# Patient Record
Sex: FEMALE | Race: WHITE | NOT HISPANIC OR LATINO | Employment: FULL TIME | URBAN - METROPOLITAN AREA
[De-identification: names, ages, dates, MRNs, and addresses within clinical notes are randomized per-mention and may not be internally consistent; named-entity substitution may affect disease eponyms.]

---

## 2023-03-02 ENCOUNTER — HOSPITAL ENCOUNTER (EMERGENCY)
Facility: MEDICAL CENTER | Age: 58
End: 2023-03-02
Attending: EMERGENCY MEDICINE
Payer: MEDICARE

## 2023-03-02 ENCOUNTER — TELEPHONE (OUTPATIENT)
Dept: HEALTH INFORMATION MANAGEMENT | Facility: OTHER | Age: 58
End: 2023-03-02
Payer: MEDICARE

## 2023-03-02 VITALS
HEIGHT: 69 IN | SYSTOLIC BLOOD PRESSURE: 144 MMHG | BODY MASS INDEX: 32.07 KG/M2 | TEMPERATURE: 98.1 F | DIASTOLIC BLOOD PRESSURE: 90 MMHG | OXYGEN SATURATION: 94 % | RESPIRATION RATE: 15 BRPM | HEART RATE: 80 BPM | WEIGHT: 216.49 LBS

## 2023-03-02 DIAGNOSIS — N30.00 ACUTE CYSTITIS WITHOUT HEMATURIA: ICD-10-CM

## 2023-03-02 LAB
ALBUMIN SERPL BCP-MCNC: 4.4 G/DL (ref 3.2–4.9)
ALBUMIN/GLOB SERPL: 1.7 G/DL
ALP SERPL-CCNC: 115 U/L (ref 30–99)
ALT SERPL-CCNC: 13 U/L (ref 2–50)
ANION GAP SERPL CALC-SCNC: 9 MMOL/L (ref 7–16)
APPEARANCE UR: CLEAR
AST SERPL-CCNC: 15 U/L (ref 12–45)
BACTERIA #/AREA URNS HPF: ABNORMAL /HPF
BASOPHILS # BLD AUTO: 0.7 % (ref 0–1.8)
BASOPHILS # BLD: 0.04 K/UL (ref 0–0.12)
BILIRUB SERPL-MCNC: 0.5 MG/DL (ref 0.1–1.5)
BILIRUB UR QL STRIP.AUTO: NEGATIVE
BUN SERPL-MCNC: 20 MG/DL (ref 8–22)
CALCIUM ALBUM COR SERPL-MCNC: 9 MG/DL (ref 8.5–10.5)
CALCIUM SERPL-MCNC: 9.3 MG/DL (ref 8.5–10.5)
CHLORIDE SERPL-SCNC: 107 MMOL/L (ref 96–112)
CO2 SERPL-SCNC: 22 MMOL/L (ref 20–33)
COLOR UR: YELLOW
CREAT SERPL-MCNC: 0.61 MG/DL (ref 0.5–1.4)
EOSINOPHIL # BLD AUTO: 0.1 K/UL (ref 0–0.51)
EOSINOPHIL NFR BLD: 1.7 % (ref 0–6.9)
EPI CELLS #/AREA URNS HPF: NEGATIVE /HPF
ERYTHROCYTE [DISTWIDTH] IN BLOOD BY AUTOMATED COUNT: 39.1 FL (ref 35.9–50)
GFR SERPLBLD CREATININE-BSD FMLA CKD-EPI: 103 ML/MIN/1.73 M 2
GLOBULIN SER CALC-MCNC: 2.6 G/DL (ref 1.9–3.5)
GLUCOSE SERPL-MCNC: 98 MG/DL (ref 65–99)
GLUCOSE UR STRIP.AUTO-MCNC: NEGATIVE MG/DL
HCT VFR BLD AUTO: 47 % (ref 37–47)
HGB BLD-MCNC: 16.3 G/DL (ref 12–16)
HYALINE CASTS #/AREA URNS LPF: ABNORMAL /LPF
IMM GRANULOCYTES # BLD AUTO: 0.03 K/UL (ref 0–0.11)
IMM GRANULOCYTES NFR BLD AUTO: 0.5 % (ref 0–0.9)
KETONES UR STRIP.AUTO-MCNC: NEGATIVE MG/DL
LEUKOCYTE ESTERASE UR QL STRIP.AUTO: ABNORMAL
LIPASE SERPL-CCNC: 24 U/L (ref 11–82)
LYMPHOCYTES # BLD AUTO: 2.03 K/UL (ref 1–4.8)
LYMPHOCYTES NFR BLD: 33.8 % (ref 22–41)
MCH RBC QN AUTO: 28.8 PG (ref 27–33)
MCHC RBC AUTO-ENTMCNC: 34.7 G/DL (ref 33.6–35)
MCV RBC AUTO: 83 FL (ref 81.4–97.8)
MICRO URNS: ABNORMAL
MONOCYTES # BLD AUTO: 0.37 K/UL (ref 0–0.85)
MONOCYTES NFR BLD AUTO: 6.2 % (ref 0–13.4)
NEUTROPHILS # BLD AUTO: 3.43 K/UL (ref 2–7.15)
NEUTROPHILS NFR BLD: 57.1 % (ref 44–72)
NITRITE UR QL STRIP.AUTO: POSITIVE
NRBC # BLD AUTO: 0 K/UL
NRBC BLD-RTO: 0 /100 WBC
PH UR STRIP.AUTO: 5.5 [PH] (ref 5–8)
PLATELET # BLD AUTO: 203 K/UL (ref 164–446)
PMV BLD AUTO: 9.5 FL (ref 9–12.9)
POTASSIUM SERPL-SCNC: 4.6 MMOL/L (ref 3.6–5.5)
PROT SERPL-MCNC: 7 G/DL (ref 6–8.2)
PROT UR QL STRIP: NEGATIVE MG/DL
RBC # BLD AUTO: 5.66 M/UL (ref 4.2–5.4)
RBC # URNS HPF: ABNORMAL /HPF
RBC UR QL AUTO: NEGATIVE
SODIUM SERPL-SCNC: 138 MMOL/L (ref 135–145)
SP GR UR STRIP.AUTO: 1.02
UROBILINOGEN UR STRIP.AUTO-MCNC: 0.2 MG/DL
WBC # BLD AUTO: 6 K/UL (ref 4.8–10.8)
WBC #/AREA URNS HPF: ABNORMAL /HPF

## 2023-03-02 PROCEDURE — A9270 NON-COVERED ITEM OR SERVICE: HCPCS | Performed by: EMERGENCY MEDICINE

## 2023-03-02 PROCEDURE — 80053 COMPREHEN METABOLIC PANEL: CPT

## 2023-03-02 PROCEDURE — 85025 COMPLETE CBC W/AUTO DIFF WBC: CPT

## 2023-03-02 PROCEDURE — 83690 ASSAY OF LIPASE: CPT

## 2023-03-02 PROCEDURE — 36415 COLL VENOUS BLD VENIPUNCTURE: CPT

## 2023-03-02 PROCEDURE — 700102 HCHG RX REV CODE 250 W/ 637 OVERRIDE(OP): Performed by: EMERGENCY MEDICINE

## 2023-03-02 PROCEDURE — 99283 EMERGENCY DEPT VISIT LOW MDM: CPT

## 2023-03-02 PROCEDURE — 81001 URINALYSIS AUTO W/SCOPE: CPT

## 2023-03-02 RX ORDER — PHENAZOPYRIDINE HYDROCHLORIDE 200 MG/1
100 TABLET, FILM COATED ORAL ONCE
Status: COMPLETED | OUTPATIENT
Start: 2023-03-02 | End: 2023-03-02

## 2023-03-02 RX ORDER — CEFDINIR 300 MG/1
300 CAPSULE ORAL ONCE
Status: COMPLETED | OUTPATIENT
Start: 2023-03-02 | End: 2023-03-02

## 2023-03-02 RX ORDER — CEFDINIR 300 MG/1
300 CAPSULE ORAL 2 TIMES DAILY
Qty: 14 CAPSULE | Refills: 0 | Status: ACTIVE | OUTPATIENT
Start: 2023-03-02 | End: 2023-03-09

## 2023-03-02 RX ORDER — IBUPROFEN 600 MG/1
600 TABLET ORAL ONCE
Status: COMPLETED | OUTPATIENT
Start: 2023-03-02 | End: 2023-03-02

## 2023-03-02 RX ORDER — PHENAZOPYRIDINE HYDROCHLORIDE 200 MG/1
200 TABLET, FILM COATED ORAL 3 TIMES DAILY PRN
Qty: 6 TABLET | Refills: 0 | Status: SHIPPED | OUTPATIENT
Start: 2023-03-02 | End: 2024-02-23

## 2023-03-02 RX ADMIN — IBUPROFEN 600 MG: 600 TABLET, FILM COATED ORAL at 08:05

## 2023-03-02 RX ADMIN — PHENAZOPYRIDINE 100 MG: 200 TABLET ORAL at 08:04

## 2023-03-02 RX ADMIN — CEFDINIR 300 MG: 300 CAPSULE ORAL at 08:04

## 2023-03-02 NOTE — ED NOTES
Discharge paperwork given to pt, all questions answered, all belongings accounted for, pt ambulated with steady gait to the ER exit

## 2023-03-02 NOTE — DISCHARGE INSTRUCTIONS
You were seen in the ER for painful urination with pain in your right kidney region.  Your urinalysis does suggest an infection which we have treated with a dose of antibiotics in the ER.  I have given you a prescription for antibiotics as well as a medication to help with the discomfort with urination while the antibiotics are starting to work.  Please take these medications only as directed.  Make sure to take the cefdinir/antibiotic prescription until it is completely gone even if you start to feel better earlier.  If you develop new or worsening symptoms you should return immediately to the ER.  Otherwise I would like you to follow-up with a primary care physician and I placed a referral in your behalf.  I hope you feel better soon!

## 2023-03-02 NOTE — ED PROVIDER NOTES
"ED Provider Note    CHIEF COMPLAINT  Chief Complaint   Patient presents with    Urinary Pain     Pt reports painful urination with frequent urination. Pain to R flank.       EXTERNAL RECORDS REVIEWED  Other none    HPI/ROS  LIMITATION TO HISTORY   Select: : None  OUTSIDE HISTORIAN(S):  None    Xochitl Martinez is a 57 y.o. female who presents with dysuria, urinary frequency, and urinary urgency that has been ongoing for the past 2 weeks.  The pain has just begun to radiate to her right flank for which she is taking ibuprofen.  She has not had any fevers.  She does have some occasional nausea but no vomiting.  No abdominal pain.  No hematuria.    PAST MEDICAL HISTORY       SURGICAL HISTORY  patient denies any surgical history    FAMILY HISTORY  History reviewed. No pertinent family history.    SOCIAL HISTORY  Social History     Tobacco Use    Smoking status: Never    Smokeless tobacco: Never   Substance and Sexual Activity    Alcohol use: Yes     Comment: occ    Drug use: Yes     Comment: marijuana-daily    Sexual activity: Not on file       CURRENT MEDICATIONS  Home Medications       Reviewed by Khalida Nielsen R.N. (Registered Nurse) on 03/02/23 at 0624  Med List Status: Not Addressed     Medication Last Dose Status        Patient Fuad Taking any Medications                           ALLERGIES  No Known Allergies    PHYSICAL EXAM  VITAL SIGNS: /84   Pulse 74   Temp 36.4 °C (97.5 °F) (Temporal)   Resp 18   Ht 1.753 m (5' 9\")   Wt 98.2 kg (216 lb 7.9 oz)   SpO2 98%   BMI 31.97 kg/m²    Physical Exam  Vitals and nursing note reviewed.   Constitutional:       Appearance: Normal appearance.   HENT:      Head: Normocephalic and atraumatic.      Right Ear: External ear normal.      Left Ear: External ear normal.      Nose: Nose normal.      Mouth/Throat:      Mouth: Mucous membranes are moist.   Eyes:      Extraocular Movements: Extraocular movements intact.      Conjunctiva/sclera: Conjunctivae normal. "      Pupils: Pupils are equal, round, and reactive to light.   Cardiovascular:      Rate and Rhythm: Normal rate and regular rhythm.      Pulses: Normal pulses.   Pulmonary:      Effort: Pulmonary effort is normal.      Breath sounds: Normal breath sounds.   Abdominal:      Palpations: Abdomen is soft.      Tenderness: There is no abdominal tenderness. There is right CVA tenderness. There is no left CVA tenderness.   Musculoskeletal:         General: Normal range of motion.      Cervical back: Normal range of motion and neck supple.   Skin:     General: Skin is warm and dry.   Neurological:      General: No focal deficit present.      Mental Status: She is alert and oriented to person, place, and time.   Psychiatric:         Mood and Affect: Mood normal.         Behavior: Behavior normal.     DIAGNOSTIC STUDIES / PROCEDURES  LABS  Results for orders placed or performed during the hospital encounter of 03/02/23   CBC with Differential   Result Value Ref Range    WBC 6.0 4.8 - 10.8 K/uL    RBC 5.66 (H) 4.20 - 5.40 M/uL    Hemoglobin 16.3 (H) 12.0 - 16.0 g/dL    Hematocrit 47.0 37.0 - 47.0 %    MCV 83.0 81.4 - 97.8 fL    MCH 28.8 27.0 - 33.0 pg    MCHC 34.7 33.6 - 35.0 g/dL    RDW 39.1 35.9 - 50.0 fL    Platelet Count 203 164 - 446 K/uL    MPV 9.5 9.0 - 12.9 fL    Neutrophils-Polys 57.10 44.00 - 72.00 %    Lymphocytes 33.80 22.00 - 41.00 %    Monocytes 6.20 0.00 - 13.40 %    Eosinophils 1.70 0.00 - 6.90 %    Basophils 0.70 0.00 - 1.80 %    Immature Granulocytes 0.50 0.00 - 0.90 %    Nucleated RBC 0.00 /100 WBC    Neutrophils (Absolute) 3.43 2.00 - 7.15 K/uL    Lymphs (Absolute) 2.03 1.00 - 4.80 K/uL    Monos (Absolute) 0.37 0.00 - 0.85 K/uL    Eos (Absolute) 0.10 0.00 - 0.51 K/uL    Baso (Absolute) 0.04 0.00 - 0.12 K/uL    Immature Granulocytes (abs) 0.03 0.00 - 0.11 K/uL    NRBC (Absolute) 0.00 K/uL   Complete Metabolic Panel   Result Value Ref Range    Sodium 138 135 - 145 mmol/L    Potassium 4.6 3.6 - 5.5 mmol/L     Chloride 107 96 - 112 mmol/L    Co2 22 20 - 33 mmol/L    Anion Gap 9.0 7.0 - 16.0    Glucose 98 65 - 99 mg/dL    Bun 20 8 - 22 mg/dL    Creatinine 0.61 0.50 - 1.40 mg/dL    Calcium 9.3 8.5 - 10.5 mg/dL    AST(SGOT) 15 12 - 45 U/L    ALT(SGPT) 13 2 - 50 U/L    Alkaline Phosphatase 115 (H) 30 - 99 U/L    Total Bilirubin 0.5 0.1 - 1.5 mg/dL    Albumin 4.4 3.2 - 4.9 g/dL    Total Protein 7.0 6.0 - 8.2 g/dL    Globulin 2.6 1.9 - 3.5 g/dL    A-G Ratio 1.7 g/dL   Lipase   Result Value Ref Range    Lipase 24 11 - 82 U/L   Urinalysis    Specimen: Urine   Result Value Ref Range    Color Yellow     Character Clear     Specific Gravity 1.020 <1.035    Ph 5.5 5.0 - 8.0    Glucose Negative Negative mg/dL    Ketones Negative Negative mg/dL    Protein Negative Negative mg/dL    Bilirubin Negative Negative    Urobilinogen, Urine 0.2 Negative    Nitrite Positive (A) Negative    Leukocyte Esterase Small (A) Negative    Occult Blood Negative Negative    Micro Urine Req Microscopic    CORRECTED CALCIUM   Result Value Ref Range    Correct Calcium 9.0 8.5 - 10.5 mg/dL   ESTIMATED GFR   Result Value Ref Range    GFR (CKD-EPI) 103 >60 mL/min/1.73 m 2   URINE MICROSCOPIC (W/UA)   Result Value Ref Range    WBC 20-50 (A) /hpf    RBC 0-2 /hpf    Bacteria Many (A) None /hpf    Epithelial Cells Negative /hpf    Hyaline Cast 0-2 /lpf     RADIOLOGY  No orders to display     COURSE & MEDICAL DECISION MAKING    ED Observation Status? No; Patient does not meet criteria for ED Observation.     INITIAL ASSESSMENT, COURSE AND PLAN  Care Narrative: This is a mable 57-year-old female who is here with dysuria, urinary frequency, and urinary urgency with pain on the right flank that has been ongoing for least the past 2 weeks.  Differential diagnosis includes, but is not limited to, UTI, pyelonephritis, nephrolithiasis, shingles, MSK.  Arrives afebrile with normal vital signs.  Appears well-hydrated and nontoxic.  Does have some right CVA tenderness without  any significant abdominal tenderness or peritoneal signs.  CBC is without leukocytosis although does appear to be slightly hemoconcentrated.  She has had some very mild nausea but no vomiting so we will rehydrate orally.  Metabolic panel is grossly normal with the exception of the alk phos slightly elevated to 115.  Lipase is normal.  Urinalysis demonstrates clear infection with positive nitrites, white blood cells, and many bacteria.  She has no RBCs in her urine and symptoms are not consistent with ureterolithiasis.  Will defer imaging today.  Although I do think it is likely that she is developing pyelonephritis she is overall incredibly well-appearing, has not had any fevers, here today is afebrile, appears well-hydrated, has no leukocytosis, and her exam is very reassuring.  I feel that she is a good candidate for trial of outpatient management.  She was given a dose of oral antibiotics here as well as ibuprofen and Pyridium.  She does not use contact lenses and we discussed that her secretions can be orange for the next 2 days while taking the Pyridium.  I have given her prescription for cefdinir.  She is to take Tylenol and ibuprofen for pain control.  We discussed the importance of increased oral hydration with at least 8 ounces of clear liquids every hour.  A referral to primary care was placed on her behalf and she was given a list of local clinics where she can establish.  She was given very strict return precautions and discharged in good and stable condition.    ADDITIONAL PROBLEM LIST  None  DISPOSITION AND DISCUSSIONS  I have discussed management of the patient with the following physicians and GAYLE's: None    Discussion of management with other Q or appropriate source(s): None     Escalation of care considered, and ultimately not performed:IV fluids, diagnostic imaging, and acute inpatient care management, however at this time, the patient is most appropriate for outpatient management    Barriers to  care at this time, including but not limited to: Patient does not have established PCP.     Decision tools and prescription drugs considered including, but not limited to: Antibiotics -cefdinir for UTI/developing pyelonephritis .    FINAL DIAGNOSIS  1. Acute cystitis without hematuria      Electronically signed by: Suman Nayak M.D., 3/2/2023 7:45 AM

## 2024-02-12 ENCOUNTER — OCCUPATIONAL MEDICINE (OUTPATIENT)
Dept: URGENT CARE | Facility: CLINIC | Age: 59
End: 2024-02-12
Payer: COMMERCIAL

## 2024-02-12 VITALS
SYSTOLIC BLOOD PRESSURE: 116 MMHG | HEART RATE: 88 BPM | OXYGEN SATURATION: 98 % | TEMPERATURE: 97.9 F | HEIGHT: 70 IN | RESPIRATION RATE: 18 BRPM | DIASTOLIC BLOOD PRESSURE: 70 MMHG | BODY MASS INDEX: 31.71 KG/M2 | WEIGHT: 221.5 LBS

## 2024-02-12 DIAGNOSIS — S90.01XA CONTUSION OF RIGHT ANKLE, INITIAL ENCOUNTER: ICD-10-CM

## 2024-02-12 PROCEDURE — 3078F DIAST BP <80 MM HG: CPT | Performed by: PHYSICIAN ASSISTANT

## 2024-02-12 PROCEDURE — 3074F SYST BP LT 130 MM HG: CPT | Performed by: PHYSICIAN ASSISTANT

## 2024-02-12 PROCEDURE — 99203 OFFICE O/P NEW LOW 30 MIN: CPT | Performed by: PHYSICIAN ASSISTANT

## 2024-02-12 ASSESSMENT — FIBROSIS 4 INDEX: FIB4 SCORE: 1.21
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
## 2024-02-23 ENCOUNTER — OFFICE VISIT (OUTPATIENT)
Dept: URGENT CARE | Facility: CLINIC | Age: 59
End: 2024-02-23
Payer: COMMERCIAL

## 2024-02-23 VITALS
WEIGHT: 222.1 LBS | HEIGHT: 70 IN | SYSTOLIC BLOOD PRESSURE: 118 MMHG | BODY MASS INDEX: 31.8 KG/M2 | TEMPERATURE: 96.6 F | DIASTOLIC BLOOD PRESSURE: 66 MMHG | OXYGEN SATURATION: 96 % | HEART RATE: 74 BPM | RESPIRATION RATE: 16 BRPM

## 2024-02-23 DIAGNOSIS — R30.0 DYSURIA: ICD-10-CM

## 2024-02-23 DIAGNOSIS — N30.01 ACUTE CYSTITIS WITH HEMATURIA: ICD-10-CM

## 2024-02-23 LAB
APPEARANCE UR: NORMAL
BILIRUB UR STRIP-MCNC: NEGATIVE MG/DL
COLOR UR AUTO: NORMAL
GLUCOSE UR STRIP.AUTO-MCNC: NEGATIVE MG/DL
KETONES UR STRIP.AUTO-MCNC: 15 MG/DL
LEUKOCYTE ESTERASE UR QL STRIP.AUTO: NORMAL
NITRITE UR QL STRIP.AUTO: NEGATIVE
PH UR STRIP.AUTO: 6 [PH] (ref 5–8)
PROT UR QL STRIP: NEGATIVE MG/DL
RBC UR QL AUTO: NORMAL
SP GR UR STRIP.AUTO: 1.03
UROBILINOGEN UR STRIP-MCNC: 0.2 MG/DL

## 2024-02-23 PROCEDURE — 99213 OFFICE O/P EST LOW 20 MIN: CPT

## 2024-02-23 PROCEDURE — 3074F SYST BP LT 130 MM HG: CPT

## 2024-02-23 PROCEDURE — 87077 CULTURE AEROBIC IDENTIFY: CPT

## 2024-02-23 PROCEDURE — 87186 SC STD MICRODIL/AGAR DIL: CPT

## 2024-02-23 PROCEDURE — 81002 URINALYSIS NONAUTO W/O SCOPE: CPT

## 2024-02-23 PROCEDURE — 87086 URINE CULTURE/COLONY COUNT: CPT

## 2024-02-23 PROCEDURE — 3078F DIAST BP <80 MM HG: CPT

## 2024-02-23 RX ORDER — PHENAZOPYRIDINE HYDROCHLORIDE 200 MG/1
200 TABLET, FILM COATED ORAL 3 TIMES DAILY
Qty: 6 TABLET | Refills: 0 | Status: SHIPPED | OUTPATIENT
Start: 2024-02-23 | End: 2024-02-24

## 2024-02-23 RX ORDER — NITROFURANTOIN 25; 75 MG/1; MG/1
100 CAPSULE ORAL EVERY 12 HOURS
Qty: 10 CAPSULE | Refills: 0 | Status: SHIPPED | OUTPATIENT
Start: 2024-02-23 | End: 2024-02-24

## 2024-02-23 ASSESSMENT — FIBROSIS 4 INDEX: FIB4 SCORE: 1.21

## 2024-02-23 ASSESSMENT — ENCOUNTER SYMPTOMS: FLANK PAIN: 0

## 2024-02-24 RX ORDER — PHENAZOPYRIDINE HYDROCHLORIDE 200 MG/1
200 TABLET, FILM COATED ORAL 3 TIMES DAILY
Qty: 6 TABLET | Refills: 0 | Status: SHIPPED | OUTPATIENT
Start: 2024-02-24 | End: 2024-02-24

## 2024-02-24 RX ORDER — NITROFURANTOIN 25; 75 MG/1; MG/1
100 CAPSULE ORAL EVERY 12 HOURS
Qty: 10 CAPSULE | Refills: 0 | Status: SHIPPED | OUTPATIENT
Start: 2024-02-24 | End: 2024-02-24

## 2024-02-24 RX ORDER — NITROFURANTOIN 25; 75 MG/1; MG/1
100 CAPSULE ORAL EVERY 12 HOURS
Qty: 10 CAPSULE | Refills: 0 | Status: SHIPPED | OUTPATIENT
Start: 2024-02-24 | End: 2024-02-29

## 2024-02-24 RX ORDER — PHENAZOPYRIDINE HYDROCHLORIDE 200 MG/1
200 TABLET, FILM COATED ORAL 3 TIMES DAILY
Qty: 6 TABLET | Refills: 0 | Status: SHIPPED | OUTPATIENT
Start: 2024-02-24 | End: 2024-02-26

## 2024-02-24 NOTE — PROGRESS NOTES
Subjective:   Xochitl Martinez is a very pleasant 58 y.o. female who presents for:    Chief Complaint   Patient presents with    UTI     Pt has bladder pain, frequent urination, painful urination x 4 days        HPI:    UTI  Episode onset: four days ago. Associated symptoms comments: Dysuria, urinary hesitancy, urinary urgency. Denies fever, hematuria, flank pain, abdominal discomfort. Treatments tried: fluids.       ROS:    Review of Systems   Genitourinary:  Positive for dysuria, frequency and urgency. Negative for flank pain and hematuria.   All other systems reviewed and are negative.      Medications:      Current Outpatient Medications   Medication Sig    phenazopyridine (PYRIDIUM) 200 MG Tab Take 1 Tablet by mouth 3 times a day for 2 days.    nitrofurantoin (MACROBID) 100 MG Cap Take 1 Capsule by mouth every 12 hours for 5 days.       Allergies:     No Known Allergies    Problem List:     There is no problem list on file for this patient.      Surgical History:    No past surgical history on file.    Past Social Hx:     Social History     Socioeconomic History    Marital status:    Tobacco Use    Smoking status: Never    Smokeless tobacco: Never   Vaping Use    Vaping Use: Never used   Substance and Sexual Activity    Alcohol use: Yes     Comment: occ    Drug use: Not Currently     Comment: marijuana-daily        Past Family Hx:      History reviewed. No pertinent family history.    Problem list, medications, and allergies reviewed by myself today in Epic.     Objective:     Vitals:    02/23/24 2112   BP: 118/66   Pulse: 74   Resp: 16   Temp: 35.9 °C (96.6 °F)   SpO2: 96%       Physical Exam  Vitals reviewed.   Constitutional:       General: She is not in acute distress.     Appearance: Normal appearance. She is normal weight. She is not ill-appearing, toxic-appearing or diaphoretic.   HENT:      Head: Normocephalic and atraumatic.      Nose: Nose normal.      Mouth/Throat:      Mouth: Mucous  membranes are moist.   Eyes:      Extraocular Movements: Extraocular movements intact.      Conjunctiva/sclera: Conjunctivae normal.      Pupils: Pupils are equal, round, and reactive to light.   Cardiovascular:      Rate and Rhythm: Normal rate and regular rhythm.      Pulses: Normal pulses.      Heart sounds: Normal heart sounds.   Pulmonary:      Effort: Pulmonary effort is normal.   Abdominal:      General: Abdomen is flat.      Palpations: Abdomen is soft.      Tenderness: There is no abdominal tenderness. There is no right CVA tenderness, left CVA tenderness or guarding.   Musculoskeletal:         General: Normal range of motion.      Cervical back: Normal range of motion.   Skin:     General: Skin is warm and dry.   Neurological:      General: No focal deficit present.      Mental Status: She is alert and oriented to person, place, and time. Mental status is at baseline.   Psychiatric:         Mood and Affect: Mood normal.         Behavior: Behavior normal.         Thought Content: Thought content normal.             Results from POCT:   Results for orders placed or performed in visit on 02/23/24   POCT Urinalysis   Result Value Ref Range    POC Color Dark Yellow Negative    POC Appearance Slightly Cloudy Negative    POC Glucose Negative Negative mg/dL    POC Bilirubin Negative Negative mg/dL    POC Ketones 15 Negative mg/dL    POC Specific Gravity 1.030 <1.005 - >1.030    POC Blood Trace-intact Negative    POC Urine PH 6.0 5.0 - 8.0    POC Protein Negative Negative mg/dL    POC Urobiligen 0.2 Negative (0.2) mg/dL    POC Nitrites Negative Negative    POC Leukocyte Esterase Small Negative       Assessment/Plan:     Diagnosis and associated orders:     1. Dysuria  - POCT Urinalysis  - URINE CULTURE(NEW); Future  - phenazopyridine (PYRIDIUM) 200 MG Tab; Take 1 Tablet by mouth 3 times a day for 2 days.  Dispense: 6 Tablet; Refill: 0    2. Acute cystitis with hematuria  - nitrofurantoin (MACROBID) 100 MG Cap; Take  1 Capsule by mouth every 12 hours for 5 days.  Dispense: 10 Capsule; Refill: 0         Comments/MDM:     Patient was nontoxic, stable. Ambulatory. Exam as above.  UA consistent with UTI  Urine culture in progress  Prescription for Macrobid sent to patient's preferred pharmacy for the treatment of acute cystitis   Supportive care reviewed with patient.    Recommend to push PO fluids and electrolytes, complete course of antibiotics, NSAIDs/Tylenol, Azo as needed for dysuria, d-mannose/raw, sugar-free cranberry juice for prevention of future UTIs, observe for resolution  Reviewed external records.   Differential diagnosis considered. Overall presentation is consistent with UTI. Low suspicion for appendicitis, gonadal torsion, STI, pyelonephritis  Discharged with instructions to obtain outpatient follow up of patient’s symptoms and findings, with strict return precautions if patient develops new or worsening symptoms.   Follow-up with PCP or UC with lack of resolution or progression of symptoms.           All questions answered. Patient verbalized understanding and is in agreement with this plan of care.     If symptoms are worsening or not improving in 3-5 days, follow-up with PCP or return to UC. Differential diagnosis, natural history, and supportive care discussed. AVS handout given and reviewed with patient. Patient educated on red flags and when to seek treatment back in ED or UC.     I personally reviewed prior external notes and test results pertinent to today's visit.  I have independently reviewed and interpreted all diagnostics ordered during this urgent care visit.     This dictation has been created using voice recognition software. The accuracy of the dictation is limited by the abilities of the software. I expect there may be some errors of grammar and possibly content. I made every attempt to manually correct the errors within my dictation. However, errors related to voice recognition software may still  exist and should be interpreted within the appropriate context.    This note was electronically signed by KIERAN Mcginnis

## 2024-02-26 LAB
BACTERIA UR CULT: ABNORMAL
BACTERIA UR CULT: ABNORMAL
SIGNIFICANT IND 70042: ABNORMAL
SITE SITE: ABNORMAL
SOURCE SOURCE: ABNORMAL

## 2024-03-08 ENCOUNTER — OFFICE VISIT (OUTPATIENT)
Dept: URGENT CARE | Facility: CLINIC | Age: 59
End: 2024-03-08
Payer: COMMERCIAL

## 2024-03-08 ENCOUNTER — HOSPITAL ENCOUNTER (OUTPATIENT)
Facility: MEDICAL CENTER | Age: 59
End: 2024-03-08
Attending: PHYSICIAN ASSISTANT
Payer: COMMERCIAL

## 2024-03-08 VITALS
BODY MASS INDEX: 32.07 KG/M2 | OXYGEN SATURATION: 99 % | DIASTOLIC BLOOD PRESSURE: 72 MMHG | RESPIRATION RATE: 12 BRPM | HEART RATE: 89 BPM | WEIGHT: 224 LBS | SYSTOLIC BLOOD PRESSURE: 120 MMHG | TEMPERATURE: 97.2 F | HEIGHT: 70 IN

## 2024-03-08 DIAGNOSIS — N30.01 ACUTE CYSTITIS WITH HEMATURIA: ICD-10-CM

## 2024-03-08 DIAGNOSIS — R35.89 POLYURIA: ICD-10-CM

## 2024-03-08 LAB
APPEARANCE UR: CLEAR
BILIRUB UR STRIP-MCNC: NEGATIVE MG/DL
COLOR UR AUTO: YELLOW
GLUCOSE UR STRIP.AUTO-MCNC: NEGATIVE MG/DL
KETONES UR STRIP.AUTO-MCNC: NEGATIVE MG/DL
LEUKOCYTE ESTERASE UR QL STRIP.AUTO: NORMAL
NITRITE UR QL STRIP.AUTO: POSITIVE
PH UR STRIP.AUTO: 6 [PH] (ref 5–8)
PROT UR QL STRIP: NEGATIVE MG/DL
RBC UR QL AUTO: NORMAL
SP GR UR STRIP.AUTO: 1.03
UROBILINOGEN UR STRIP-MCNC: 0.2 MG/DL

## 2024-03-08 PROCEDURE — 3078F DIAST BP <80 MM HG: CPT | Performed by: PHYSICIAN ASSISTANT

## 2024-03-08 PROCEDURE — 87086 URINE CULTURE/COLONY COUNT: CPT

## 2024-03-08 PROCEDURE — 81002 URINALYSIS NONAUTO W/O SCOPE: CPT | Performed by: PHYSICIAN ASSISTANT

## 2024-03-08 PROCEDURE — 99213 OFFICE O/P EST LOW 20 MIN: CPT | Performed by: PHYSICIAN ASSISTANT

## 2024-03-08 PROCEDURE — 3074F SYST BP LT 130 MM HG: CPT | Performed by: PHYSICIAN ASSISTANT

## 2024-03-08 RX ORDER — PHENAZOPYRIDINE HYDROCHLORIDE 200 MG/1
200 TABLET, FILM COATED ORAL 3 TIMES DAILY PRN
Qty: 6 TABLET | Refills: 0 | Status: SHIPPED | OUTPATIENT
Start: 2024-03-08

## 2024-03-08 RX ORDER — CEFDINIR 300 MG/1
300 CAPSULE ORAL EVERY 12 HOURS
Qty: 14 CAPSULE | Refills: 0 | Status: SHIPPED | OUTPATIENT
Start: 2024-03-08 | End: 2024-03-15

## 2024-03-08 RX ORDER — ERYTHROMYCIN 5 MG/G
OINTMENT OPHTHALMIC
COMMUNITY
Start: 2023-12-18

## 2024-03-08 ASSESSMENT — ENCOUNTER SYMPTOMS
ABDOMINAL PAIN: 0
FLANK PAIN: 0
FEVER: 0
NAUSEA: 0
VOMITING: 0
CHILLS: 0

## 2024-03-08 ASSESSMENT — FIBROSIS 4 INDEX: FIB4 SCORE: 1.21

## 2024-03-09 DIAGNOSIS — N30.01 ACUTE CYSTITIS WITH HEMATURIA: ICD-10-CM

## 2024-03-09 NOTE — PROGRESS NOTES
"Subjective:   Xochitl Martinez  is a 58 y.o. female who presents for UTI (Pt has pelvic pressure, frequent urination, urgency x 4 days )      UTI  This is a new problem. The current episode started in the past 7 days. Pertinent negatives include no abdominal pain, chills, fever, nausea or vomiting.   Patient presents urgent care describing symptoms of UTI over the last 4 days.  She complains of pelvic pressure, urinary frequency and some suprapubic discomfort with urinating.  She describes some urinary urgency.  She denies fevers chills or nausea vomiting.  Denies flank pain.  She denies history of recurrent or very frequent urinary tract infections but happened to have 1 around 2 weeks ago.  At that time culture showed Enterococcus bacillus and patient was treated with Macrobid.  She reports \"near complete\" resolution of symptoms following course of antibiotic.    Review of Systems   Constitutional:  Negative for chills and fever.   Gastrointestinal:  Negative for abdominal pain, nausea and vomiting.   Genitourinary:  Positive for dysuria, frequency and urgency. Negative for flank pain and hematuria.       No Known Allergies     Objective:   /72 (BP Location: Left arm, Patient Position: Sitting, BP Cuff Size: Large adult)   Pulse 89   Temp 36.2 °C (97.2 °F) (Temporal)   Resp 12   Ht 1.765 m (5' 9.5\")   Wt 102 kg (224 lb)   SpO2 99%   BMI 32.60 kg/m²     Physical Exam  Vitals and nursing note reviewed.   Constitutional:       General: She is not in acute distress.     Appearance: Normal appearance. She is well-developed. She is not diaphoretic.   HENT:      Head: Normocephalic and atraumatic.      Right Ear: External ear normal.      Left Ear: External ear normal.      Nose: Nose normal.   Eyes:      General: Lids are normal. No scleral icterus.        Right eye: No discharge.         Left eye: No discharge.      Conjunctiva/sclera: Conjunctivae normal.   Pulmonary:      Effort: Pulmonary effort is " normal. No respiratory distress.   Abdominal:      Palpations: Abdomen is soft. Abdomen is not rigid.      Tenderness: There is no abdominal tenderness. There is no right CVA tenderness, left CVA tenderness or guarding.   Musculoskeletal:         General: Normal range of motion.      Cervical back: Neck supple.   Skin:     General: Skin is warm and dry.      Coloration: Skin is not pale.      Findings: No erythema.   Neurological:      Mental Status: She is alert and oriented to person, place, and time. She is not disoriented.   Psychiatric:         Speech: Speech normal.         Behavior: Behavior normal.     POCT UA - +++ (see chart)  Urine culture pending    Assessment/Plan:   1. Acute cystitis with hematuria  - URINE CULTURE(NEW); Future  - cefdinir (OMNICEF) 300 MG Cap; Take 1 Capsule by mouth every 12 hours for 7 days.  Dispense: 14 Capsule; Refill: 0  - phenazopyridine (PYRIDIUM) 200 MG Tab; Take 1 Tablet by mouth 3 times a day as needed for Mild Pain or Moderate Pain.  Dispense: 6 Tablet; Refill: 0    2. Polyuria  - POCT Urinalysis    Other orders  - erythromycin 5 MG/GM Ointment; APPLY TO INCISION LINES OF BOTH UPPER EYE LIDS THREE TIMES DAILY FOR 1 WEEK FOLLOWING EYELID SURGERY THEN AS DIRECTED BY DISCARD REMAINDER KO  Supportive care is reviewed with patient/caregiver - recommend to push PO fluids and electrolytes, nsaids/tylenol, rest, full course of abx, probiotics w/ abx, azo/cran, observe for resolution  Return to clinic with lack of resolution or progression of symptoms.  Will call if change of abx is indicated by urine cx      I have worn an N95 mask, gloves and eye protection for the entire encounter with this patient.     Differential diagnosis, natural history, supportive care, and indications for immediate follow-up discussed.

## 2024-03-11 LAB
BACTERIA UR CULT: NORMAL
SIGNIFICANT IND 70042: NORMAL
SITE SITE: NORMAL
SOURCE SOURCE: NORMAL

## 2024-10-15 ENCOUNTER — HOSPITAL ENCOUNTER (OUTPATIENT)
Dept: RADIOLOGY | Facility: MEDICAL CENTER | Age: 59
End: 2024-10-15
Payer: COMMERCIAL

## 2024-10-15 DIAGNOSIS — S70.01XA HIP HEMATOMA, RIGHT, INITIAL ENCOUNTER: ICD-10-CM

## 2024-10-15 PROCEDURE — 72195 MRI PELVIS W/O DYE: CPT

## 2024-12-22 ENCOUNTER — OFFICE VISIT (OUTPATIENT)
Dept: URGENT CARE | Facility: CLINIC | Age: 59
End: 2024-12-22
Payer: MEDICARE

## 2024-12-22 VITALS
OXYGEN SATURATION: 98 % | SYSTOLIC BLOOD PRESSURE: 120 MMHG | DIASTOLIC BLOOD PRESSURE: 84 MMHG | HEART RATE: 69 BPM | HEIGHT: 70 IN | RESPIRATION RATE: 16 BRPM | BODY MASS INDEX: 32.44 KG/M2 | TEMPERATURE: 97.5 F | WEIGHT: 226.6 LBS

## 2024-12-22 DIAGNOSIS — M62.830 MUSCLE SPASM OF BACK: ICD-10-CM

## 2024-12-22 DIAGNOSIS — S33.5XXA LUMBAR SPRAIN, INITIAL ENCOUNTER: ICD-10-CM

## 2024-12-22 PROCEDURE — 3079F DIAST BP 80-89 MM HG: CPT | Performed by: STUDENT IN AN ORGANIZED HEALTH CARE EDUCATION/TRAINING PROGRAM

## 2024-12-22 PROCEDURE — 3074F SYST BP LT 130 MM HG: CPT | Performed by: STUDENT IN AN ORGANIZED HEALTH CARE EDUCATION/TRAINING PROGRAM

## 2024-12-22 PROCEDURE — 99213 OFFICE O/P EST LOW 20 MIN: CPT | Performed by: STUDENT IN AN ORGANIZED HEALTH CARE EDUCATION/TRAINING PROGRAM

## 2024-12-22 RX ORDER — METHYLPREDNISOLONE 4 MG/1
TABLET ORAL
Qty: 21 TABLET | Refills: 0 | Status: SHIPPED | OUTPATIENT
Start: 2024-12-22

## 2024-12-22 RX ORDER — CYCLOBENZAPRINE HCL 5 MG
5-10 TABLET ORAL 3 TIMES DAILY PRN
Qty: 30 TABLET | Refills: 0 | Status: SHIPPED | OUTPATIENT
Start: 2024-12-22

## 2024-12-22 ASSESSMENT — FIBROSIS 4 INDEX: FIB4 SCORE: 1.23

## 2024-12-22 ASSESSMENT — ENCOUNTER SYMPTOMS: BACK PAIN: 1

## 2024-12-22 NOTE — PROGRESS NOTES
"Subjective:   Xochitl Martinez is a 59 y.o. female who presents for Back Pain (X3-4 days, was dancing at a work party on Thursday, left knee hurt and was favoring it. Then started having back pain lower left side, states there is a tightness on the bottom of the back.)      HPI:  This is a new acute problem.  59-year-old female presents with back spasms and back pain.  She reports that Hurt her knee on Thursday, while at a work party and on Friday and Saturday was limping and favoring her left side which caused her back to go out and start spasming.  She denies any numbness tingling down the leg denies any urinary symptoms any saddle anesthesia.  She denies any bony back pain she reports feeling of tightness spasms on the left side of her back worse than her right.  Debilitating difficulty sleeping feeling supertight and lots of discomfort.  Reporting ibuprofen and heat packs with very minimal improvement.  She denies any fevers or chills.  She denies any falls or trauma to the area.          Review of Systems   Musculoskeletal:  Positive for back pain.       Medications:    erythromycin Oint  meloxicam  phenazopyridine Tabs    Allergies: Patient has no known allergies.    Problem List: Xochitl Martinez does not have a problem list on file.    Surgical History:  No past surgical history on file.    Past Social Hx: Xochitl Martinez  reports that she has never smoked. She has never used smokeless tobacco. She reports current alcohol use. She reports that she does not currently use drugs.     Past Family Hx:  Xochitl Martinez family history is not on file.     Problem list, medications, and allergies reviewed by myself today in Epic.     Objective:     /84 (BP Location: Right arm, Patient Position: Sitting, BP Cuff Size: Adult)   Pulse 69   Temp 36.4 °C (97.5 °F)   Resp 16   Ht 1.765 m (5' 9.5\")   Wt 103 kg (226 lb 9.6 oz)   SpO2 98%   BMI 32.98 kg/m²     Physical Exam  Constitutional:      "  Appearance: Normal appearance.   Cardiovascular:      Rate and Rhythm: Normal rate and regular rhythm.      Pulses: Normal pulses.      Heart sounds: Normal heart sounds.   Pulmonary:      Effort: Pulmonary effort is normal.      Breath sounds: Normal breath sounds.   Musculoskeletal:      Cervical back: Normal.      Thoracic back: Normal.      Lumbar back: Spasms present. No swelling, edema, signs of trauma or bony tenderness. Decreased range of motion.      Comments: Left-sided muscle spasms and tenderness to palpation on the paraspinal muscles with radiation towards her hip flexor.   Neurological:      Mental Status: She is alert.         Assessment/Plan:     Diagnosis and associated orders:     1. Lumbar sprain, initial encounter  methylPREDNISolone (MEDROL DOSEPAK) 4 MG Tablet Therapy Pack    cyclobenzaprine (FLEXERIL) 5 mg tablet      2. Muscle spasm of back  methylPREDNISolone (MEDROL DOSEPAK) 4 MG Tablet Therapy Pack    cyclobenzaprine (FLEXERIL) 5 mg tablet         Comments/MDM:     1. Lumbar sprain, initial encounter    2. Muscle spasm of back  -No bony tenderness noted no history of trauma will not get x-ray at this time  - No red flag symptoms  - No neuropathies noted  - Will send in a steroid Dosepak as well as a muscle relaxer.  Patient instructed on conservative measures to treat her muscle spasms of back pain including heat packs, ice packs, rest, gentle range of motion exercises.  - Return precaution discussed including development of any neuropathies, any saddle anesthesia, any trouble going to the bathroom or loss of urine.  Any development of fever or chills.  - If no improvement in the next week recommend reevaluation and likely physical therapy referral  - methylPREDNISolone (MEDROL DOSEPAK) 4 MG Tablet Therapy Pack; Follow schedule on package instructions.  Dispense: 21 Tablet; Refill: 0  - cyclobenzaprine (FLEXERIL) 5 mg tablet; Take 1-2 Tablets by mouth 3 times a day as needed for Mild Pain  or Muscle Spasms.  Dispense: 30 Tablet; Refill: 0         Differential diagnosis, natural history, supportive care, and indications for immediate follow-up discussed.    Advised the patient to follow-up with the primary care physician for recheck, reevaluation, and consideration of further management.    Please note that this dictation was created using voice recognition software. I have made a reasonable attempt to correct obvious errors, but I expect that there are errors of grammar and possibly content that I did not discover before finalizing the note.    Venkat Torres M.D.

## 2024-12-22 NOTE — LETTER
December 22, 2024         Patient: Xochitl Martinez   YOB: 1965   Date of Visit: 12/22/2024           To Whom it May Concern:    Xochitl Martinez was seen in my clinic on 12/22/2024. Please excuse from work from 12/22-12/23.    If you have any questions or concerns, please don't hesitate to call.        Sincerely,           Venkat Torres M.D.  Electronically Signed

## 2025-02-09 ENCOUNTER — OFFICE VISIT (OUTPATIENT)
Dept: URGENT CARE | Facility: CLINIC | Age: 60
End: 2025-02-09
Payer: COMMERCIAL

## 2025-02-09 ENCOUNTER — APPOINTMENT (OUTPATIENT)
Dept: RADIOLOGY | Facility: IMAGING CENTER | Age: 60
End: 2025-02-09
Attending: PHYSICIAN ASSISTANT
Payer: COMMERCIAL

## 2025-02-09 VITALS
DIASTOLIC BLOOD PRESSURE: 80 MMHG | TEMPERATURE: 97.1 F | HEIGHT: 69 IN | BODY MASS INDEX: 34.64 KG/M2 | HEART RATE: 76 BPM | SYSTOLIC BLOOD PRESSURE: 128 MMHG | RESPIRATION RATE: 16 BRPM | OXYGEN SATURATION: 96 % | WEIGHT: 233.9 LBS

## 2025-02-09 DIAGNOSIS — L08.9 INFECTED WOUND: ICD-10-CM

## 2025-02-09 DIAGNOSIS — M79.671 RIGHT FOOT PAIN: ICD-10-CM

## 2025-02-09 DIAGNOSIS — T14.8XXA INFECTED WOUND: ICD-10-CM

## 2025-02-09 PROCEDURE — 73630 X-RAY EXAM OF FOOT: CPT | Mod: TC,RT | Performed by: RADIOLOGY

## 2025-02-09 PROCEDURE — 3074F SYST BP LT 130 MM HG: CPT | Performed by: PHYSICIAN ASSISTANT

## 2025-02-09 PROCEDURE — 3079F DIAST BP 80-89 MM HG: CPT | Performed by: PHYSICIAN ASSISTANT

## 2025-02-09 PROCEDURE — 99214 OFFICE O/P EST MOD 30 MIN: CPT | Performed by: PHYSICIAN ASSISTANT

## 2025-02-09 RX ORDER — SULFAMETHOXAZOLE AND TRIMETHOPRIM 800; 160 MG/1; MG/1
1 TABLET ORAL 2 TIMES DAILY
Qty: 14 TABLET | Refills: 0 | Status: SHIPPED | OUTPATIENT
Start: 2025-02-09 | End: 2025-02-16

## 2025-02-09 ASSESSMENT — FIBROSIS 4 INDEX: FIB4 SCORE: 1.23

## 2025-02-14 ASSESSMENT — ENCOUNTER SYMPTOMS
NAUSEA: 0
CHILLS: 0
FEVER: 0
VOMITING: 0
SENSORY CHANGE: 0
TINGLING: 0

## 2025-02-14 NOTE — PROGRESS NOTES
Subjective     Xochitl Martinez is a 59 y.o. female who presents with Other (X2 weeks ago fell on ice and has road burn on top of right foot and is not healing.)    HPI:  Xochitl Martinez is a 59 y.o. female who presents today for evaluation of an injury to her right foot.  Patient reports that 2 weeks ago she was walking outside barefoot during the snow and ice.  She slipped and fell.  Says that she scraped her right knee and the top of her right foot.  She says the knee has been improving but she has 2 wounds on the top of her foot that remain open and do not seem to be healing.  She is also had some associated swelling and pain.  She denies any history of diabetes or other immunocompromising conditions.  Notes that she has been keeping the wounds clean and and covered while she is at work where she works 12-hour shifts and is on her feet most of that time.  She also notes that she has been cleaning the wounds daily with hydrogen peroxide.        Review of Systems   Constitutional:  Negative for chills and fever.   Gastrointestinal:  Negative for nausea and vomiting.   Musculoskeletal:         Injury to right foot with open wounds   Neurological:  Negative for tingling and sensory change.           PMH:  has no past medical history on file.  MEDS:   Current Outpatient Medications:     sulfamethoxazole-trimethoprim (BACTRIM DS) 800-160 MG tablet, Take 1 Tablet by mouth 2 times a day for 7 days., Disp: 14 Tablet, Rfl: 0    methylPREDNISolone (MEDROL DOSEPAK) 4 MG Tablet Therapy Pack, Follow schedule on package instructions. (Patient not taking: Reported on 2/9/2025), Disp: 21 Tablet, Rfl: 0    cyclobenzaprine (FLEXERIL) 5 mg tablet, Take 1-2 Tablets by mouth 3 times a day as needed for Mild Pain or Muscle Spasms. (Patient not taking: Reported on 2/9/2025), Disp: 30 Tablet, Rfl: 0    meloxicam (MOBIC) 15 MG tablet, Take 1 Tablet by mouth 1 time a day as needed for Mild Pain or Moderate Pain. (Patient not  "taking: Reported on 2/9/2025), Disp: 30 Tablet, Rfl: 0    erythromycin 5 MG/GM Ointment, APPLY TO INCISION LINES OF BOTH UPPER EYE LIDS THREE TIMES DAILY FOR 1 WEEK FOLLOWING EYELID SURGERY THEN AS DIRECTED BY ELSA DICKSON (Patient not taking: Reported on 2/9/2025), Disp: , Rfl:     phenazopyridine (PYRIDIUM) 200 MG Tab, Take 1 Tablet by mouth 3 times a day as needed for Mild Pain or Moderate Pain. (Patient not taking: Reported on 2/9/2025), Disp: 6 Tablet, Rfl: 0  ALLERGIES: No Known Allergies  SURGHX: History reviewed. No pertinent surgical history.  SOCHX:  reports that she has never smoked. She has never used smokeless tobacco. She reports current alcohol use. She reports that she does not currently use drugs.  FH: Family history was reviewed, no pertinent findings to report      Objective     /80   Pulse 76   Temp 36.2 °C (97.1 °F) (Temporal)   Resp 16   Ht 1.753 m (5' 9\")   Wt 106 kg (233 lb 14.4 oz)   SpO2 96%   BMI 34.54 kg/m²      Physical Exam  Constitutional:       General: She is not in acute distress.     Appearance: She is not diaphoretic.   HENT:      Head: Normocephalic and atraumatic.      Right Ear: External ear normal.      Left Ear: External ear normal.   Eyes:      Conjunctiva/sclera: Conjunctivae normal.      Pupils: Pupils are equal, round, and reactive to light.   Pulmonary:      Effort: Pulmonary effort is normal. No respiratory distress.   Musculoskeletal:      Cervical back: Normal range of motion.      Right foot: Swelling present.        Legs:       Comments: Patient with 2 shallow wounds noted to the dorsum of the right foot.  1 over the first MCP joint and another 1 over the proximal foot.  There is some tenderness and surrounding soft tissue swelling with mild surrounding erythema and induration.  No fluctuance or current discharge.   Skin:     Findings: No rash.   Neurological:      Mental Status: She is alert and oriented to person, place, and time. "   Psychiatric:         Mood and Affect: Mood and affect normal.         Cognition and Memory: Memory normal.         Judgment: Judgment normal.              DX-FOOT-COMPLETE 3+ RIGHT  FINDINGS:  There is normal bony mineralization.  There is no evidence of fracture, dislocation, or osseous lesion.  There is no evidence of soft tissue injury.     IMPRESSION:  1.  No radiographic evidence of acute injury.      *X-rays were reviewed and interpreted independently by me. I agree with the radiologist's findings       Assessment & Plan     1. Right foot pain  - DX-FOOT-COMPLETE 3+ RIGHT; Future    2. Infected wound  - sulfamethoxazole-trimethoprim (BACTRIM DS) 800-160 MG tablet; Take 1 Tablet by mouth 2 times a day for 7 days.  Dispense: 14 Tablet; Refill: 0    Patient presenting for continued foot pain and open wounds for the past 2 weeks after slipping on ice.  X-ray was negative for any acute fracture, osseous abnormality, or obvious foreign body.  Discussed that she should stop cleaning the wounds with hydrogen peroxide.  Recommend that she clean them gently with soap and water.  She should keep them covered when she is wearing shoes but also discussed that she should try to have time open to the air so that they can help form a scab.  Likely, the fact that she has been on her feet for 12+ hours each day is contributing to the wounds not healing.  Recommend RICE therapies and use of OTC anti-inflammatories to help.  Try to wear loosefitting shoes and stay off of her feet is much as possible the next few days.  We will also initiate treatment with oral antibiotics for what appears to be mild infection of both of the wounds.  Recommend she try to follow-up with her primary care provider in the next 5 to 7 days for recheck if symptoms not resolving.          Differential Diagnosis, natural history, and supportive care discussed. Return to the Urgent Care or follow up with your PCP if symptoms fail to resolve, or for any  new or worsening symptoms. Emergency room precautions discussed. Patient and/or family appears understanding of information.

## 2025-02-23 ENCOUNTER — HOSPITAL ENCOUNTER (OUTPATIENT)
Facility: MEDICAL CENTER | Age: 60
End: 2025-02-23
Payer: COMMERCIAL

## 2025-02-23 DIAGNOSIS — R30.0 DYSURIA: ICD-10-CM

## 2025-04-14 ENCOUNTER — APPOINTMENT (OUTPATIENT)
Dept: RADIOLOGY | Facility: IMAGING CENTER | Age: 60
End: 2025-04-14
Payer: COMMERCIAL

## 2025-04-14 ENCOUNTER — OCCUPATIONAL MEDICINE (OUTPATIENT)
Dept: URGENT CARE | Facility: CLINIC | Age: 60
End: 2025-04-14
Payer: COMMERCIAL

## 2025-04-14 VITALS
BODY MASS INDEX: 33.2 KG/M2 | WEIGHT: 231.92 LBS | HEIGHT: 70 IN | HEART RATE: 83 BPM | DIASTOLIC BLOOD PRESSURE: 80 MMHG | RESPIRATION RATE: 12 BRPM | OXYGEN SATURATION: 96 % | TEMPERATURE: 97.9 F | SYSTOLIC BLOOD PRESSURE: 136 MMHG

## 2025-04-14 DIAGNOSIS — S50.02XA CONTUSION OF LEFT ELBOW, INITIAL ENCOUNTER: Primary | ICD-10-CM
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
## 2025-04-14 DIAGNOSIS — M25.522 LEFT ELBOW PAIN: ICD-10-CM

## 2025-04-14 DIAGNOSIS — S00.83XA CONTUSION OF LEFT JAW REGION: ICD-10-CM

## 2025-04-14 PROCEDURE — 99214 OFFICE O/P EST MOD 30 MIN: CPT

## 2025-04-14 PROCEDURE — 3075F SYST BP GE 130 - 139MM HG: CPT

## 2025-04-14 PROCEDURE — 3079F DIAST BP 80-89 MM HG: CPT

## 2025-04-14 PROCEDURE — 73080 X-RAY EXAM OF ELBOW: CPT | Mod: TC,LT | Performed by: RADIOLOGY

## 2025-04-14 ASSESSMENT — ENCOUNTER SYMPTOMS
VOMITING: 0
HEADACHES: 0
FEVER: 0
PALPITATIONS: 0
CHILLS: 0
EYE DISCHARGE: 0
COUGH: 0
ABDOMINAL PAIN: 0
SHORTNESS OF BREATH: 0
NAUSEA: 0
EYE REDNESS: 0
EYE PAIN: 0
MYALGIAS: 0
SORE THROAT: 0
DIARRHEA: 0
STRIDOR: 0
WHEEZING: 0
DIZZINESS: 0
SPUTUM PRODUCTION: 0

## 2025-04-14 NOTE — PROGRESS NOTES
Subjective:     Xochitl Martinez is a 59 y.o. female who presents for Work-Related Injury (WC DOI: 4/13/2025, Tripped on drainage lid in work cafe, left elbow feels out of place, sore/tender, unable to extend arm upwards. Bruising on left side chin)       DOI: 04/13/2025.   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  Patient states she has been having pain and soreness since, mostly to her left elbow, states her elbow feels out of place, she is unable to fully extend it.  Patient describes symptoms as constant. They describe the pain as sharp, aching, 1-4 out of 10. Aggravating factors include using the arm and elbow. Relieving factors include rest. Treatments tried at home include OTC ibuprofen with some relief. They describe their symptoms as moderate.  She denies any jaw malocclusion, tooth fractures, difficulty chewing or swallowing, fever, chills, nausea or vomiting.      Review of Systems   Constitutional:  Negative for chills, fever and malaise/fatigue.   HENT:  Negative for congestion, ear pain and sore throat.    Eyes:  Negative for pain, discharge and redness.   Respiratory:  Negative for cough, sputum production, shortness of breath, wheezing and stridor.    Cardiovascular:  Negative for chest pain and palpitations.   Gastrointestinal:  Negative for abdominal pain, diarrhea, nausea and vomiting.   Genitourinary:  Negative for dysuria.   Musculoskeletal:  Positive for joint pain. Negative for myalgias.        Positive for left elbow pain   Skin:  Negative for rash.   Neurological:  Negative for dizziness and headaches.       Refer to HPI for additional details.    During this visit, appropriate PPE was worn and hand hygiene was performed.    FH: Per HPI as applicable/pertinent.  PMH: No pertinent past  "medical history to this problem  MEDS: Medications were reviewed in Epic  ALLERGIES: Allergies were reviewed in Epic  SOCHX: Works as a  at [employer]   FH: No pertinent family history to this problem      Objective:     /80   Pulse 83   Temp 36.6 °C (97.9 °F) (Temporal)   Resp 12   Ht 1.765 m (5' 9.5\")   Wt 105 kg (231 lb 14.8 oz)   SpO2 96%   BMI 33.76 kg/m²     Physical Exam  Vitals reviewed.   Constitutional:       General: She is not in acute distress.     Appearance: Normal appearance. She is normal weight. She is not ill-appearing or toxic-appearing.   HENT:      Head: Normocephalic and atraumatic.      Right Ear: External ear normal.      Left Ear: External ear normal.      Nose: No congestion or rhinorrhea.   Eyes:      General: No scleral icterus.        Right eye: No discharge.         Left eye: No discharge.      Extraocular Movements: Extraocular movements intact.      Conjunctiva/sclera: Conjunctivae normal.   Cardiovascular:      Rate and Rhythm: Normal rate.   Pulmonary:      Effort: Pulmonary effort is normal.   Abdominal:      General: There is no distension.   Genitourinary:     Comments: Deferred  Musculoskeletal:         General: Signs of injury present. No swelling or tenderness. Normal range of motion.      Right lower leg: No edema.      Left lower leg: No edema.      Comments: There is a small area of ecchymosis on the patient's lower jaw just left of her chin.  Area is very mildly TTP.  There is no jaw malocclusion, no sign of tooth fractures.  There are no obvious bony deformities or step-offs palpated.  Patient's left shoulder appears normal and there is no TTP, she does have a full ROM of the shoulder.  Exam of patient's left elbow shows a small superficial abrasion type wound that is dry and intact to left lateral epicondyle area.  There is some TTP to this area, no obvious bony deformities or step-offs.  Patient does appear to have full ROM of elbow however " complains of pain to full extension.  Grasp strength is 4/5 compared to 5/5 in the right.  NVID with cap refill to distal digits less than 3 seconds, sensation intact hard and soft, radial pulse 2+.   Skin:     General: Skin is warm and dry.   Neurological:      General: No focal deficit present.      Mental Status: She is alert and oriented to person, place, and time. Mental status is at baseline.   Psychiatric:         Mood and Affect: Mood normal.         Behavior: Behavior normal.       RADIOLOGY RESULTS   DX-ELBOW-COMPLETE 3+ LEFT  Result Date: 4/14/2025 4/14/2025 2:16 PM HISTORY/REASON FOR EXAM:  Pain/Deformity Following Trauma Left elbow pain TECHNIQUE/EXAM DESCRIPTION AND NUMBER OF VIEWS:  3 views of the LEFT elbow. COMPARISON: None FINDINGS: No acute fracture or dislocation. No elbow joint effusion. Mild osteoarthritis.     No acute osseous abnormality.              Assessment/Plan:     1. Left elbow pain  - DX-ELBOW-COMPLETE 3+ LEFT; Future    2. Contusion of left elbow, initial encounter    3. Contusion of left jaw region    4. Work related injury         Discussed with patient/family Huey Chen, management options (risks,benefits, and alternatives to planned treatment), natural progression.  I did go over the x-rays with patient.  Questions were encouraged and answered  Supportive care measures were discussed including the following -   RICE  Tylenol and ibuprofen for pain and inflammation, discussed appropriate dosages.  Ace wrap to left elbow, rewrap as needed   Instructed patient ongoing assessment of color, movement, sensation of digits  Discussed red flags and reasons return to urgent care versus when to go to the emergency room for increased pain, loss of color, movement, or  sensation to the digits, any increased redness, swelling, localized heat, especially if there is fever, chills, nausea and vomiting, lethargy.    Will place her on work restrictions, no use of left arm  Patient to return for  follow-up evaluation in 1 week  Worker's Compensation paperwork [id number] completed, see scanned paperwork for details  Patient states she has good understanding of all instructions and is agreeable with the plan of care.       Pt is clinically stable at today's acute urgent care visit. Vital signs are normal and reassuring.  No acute distress noted. Appropriate for outpatient management at this time.        I personally reviewed prior external notes and test results pertinent to today's visit.  I have independently reviewed and interpreted all diagnostics ordered during this urgent care acute visit.        Please note that this dictation was created using voice recognition software. I have made a reasonable attempt to correct obvious errors, but I expect that there are errors of grammar and possibly content that I did not discover before finalizing the note.    This note was electronically signed by ASTRID Moreland, YAYO, LONG

## 2025-04-14 NOTE — LETTER
PHYSICIAN’S AND CHIROPRACTIC PHYSICIAN'S   PROGRESS REPORT   CERTIFICATION OF DISABILITY Claim Number:     Social Security Number:    Patient’s Name: Xochitl Martinez Date of Injury: 4/13/2025   Employer: [employer] [facility] Name of MCO (if applicable):      Patient’s Job Description/Occupation: [employer] 2       Previous Injuries/Diseases/Surgeries Contributing to the Condition:  None      Diagnosis: (S50.02XA) Contusion of left elbow, initial encounter  (primary encounter diagnosis)  (M25.522) Left elbow pain  (S00.83XA) Contusion of left jaw region  (Y99.0) Work related injury      Related to the Industrial Injury? 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       Objective Medical Findings: There is a small area of ecchymosis on the patient's lower jaw just left of her chin.  Area is very mildly TTP.  There is no jaw malocclusion, no sign of tooth fractures.  There are no obvious bony deformities or step-offs palpated.  Patient's left shoulder appears normal and there is no TTP, she does have a full ROM of the shoulder.  Exam of patient's left elbow shows a small superficial abrasion type wound that is dry and intact to left lateral epicondyle area.  There is some TTP to this area, no obvious bony deformities or step-offs.  Patient does appear to have full ROM of elbow however complains of pain to full extension.  Grasp strength is 4/5 compared to 5/5 in the right.  NVID with cap refill to distal digits less than 3 seconds, sensation intact hard and soft, radial pulse 2+.           None - Discharged                         Stable  No                 Ratable  No        Generally Improved                         Condition Worsened                  Condition Same  May Have Suffered a Permanent Disability No     Treatment Plan:    · Discussed with  patient/family Dx,  DDx, management options (risks,benefits, and alternatives to planned treatment), natural progression.  I did go over the x-rays with patient.  · Questions were encouraged and answered  · Supportive care measures were discussed including the following -   · RICE  · Tylenol and ibuprofen for pain and inflammation, discussed appropriate dosages.  · Ace wrap to left elbow, rewrap as needed  ·  Instructed patient ongoing assessment of color, movement, sensation of digits  · Discussed red flags and reasons return to urgent care versus when to go to the emergency room for increased pain, loss of color, movement, or  sensation to the digits, any increased redness, swelling, localized heat, especially if there is fever, chills, nausea and vomiting, lethargy.    · Will place her on work restrictions, no use of left arm  · Patient to return for follow-up evaluation in 1 week  · Worker's Compensation paperwork C4 D39 completed, see scanned paperwork for details  · Patient states she has good understanding of all instructions and is agreeable with the plan of care.           No Change in Therapy                  PT/OT Prescribed                      Medication May be Used While Working        Case Management                          PT/OT Discontinued    Consultation    Further Diagnostic Studies:    Prescription(s)                 Released to FULL DUTY /No Restrictions on (Date):       Certified TOTALLY TEMPORARILY DISABLED (Indicate Dates) From:   To:    X  Released to RESTRICTED/Modified Duty on (Date): From: 4/14/2025 To: 4/18/2025  Restrictions Are:         No Sitting    No Standing    No Pulling Other: Restrictions apply to left arm.  No use of left arm while at work, must be able to wear Ace wrap to left elbow while at work       No Bending at Waist     No Stooping X    No Lifting    X    No Carrying     No Walking Lifting Restricted to (lbs.):       X   No Pushing        No Climbing     No Reaching  Above Shoulders       Date of Next Visit:  4/18/2025 Date of this Exam: 4/14/2025 Physician/Chiropractic Physician Name: KIERAN Nielsen Physician/Chiropractic Physician Signature:  Speedy Marquez DO MPH     [place]:  [facility]  [facility], [address] [place], [place] [address] - Telephone [phone] [place]:  [address] [place] [address], [address] [place], [place] [address] - Telephone [phone]    [email or web address]  D-39 (Rev. 10/24)

## 2025-04-14 NOTE — LETTER
"  EMPLOYEE’S CLAIM FOR COMPENSATION/ REPORT OF INITIAL TREATMENT  FORM C-4  PLEASE TYPE OR PRINT    EMPLOYEE’S CLAIM - PROVIDE ALL INFORMATION REQUESTED   First Name                    VIKTORIYA Leung                  Last Name  Juan Pablo Martinez Birthdate                    1965                Sex  [x]Female Claim Number (Insurer’s Use Only)     Mailing Address  [address] Age  59 y.o. 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 (if applicable)  Walking in the cafeteria    How did this injury or occupational disease occur? (Be specific and answer in detail. Use additional sheet if necessary)  I was walking in the cafeteria and I tripped on a drainage hole that wasn't properly cover   If you believe that you have an occupational disease, when did you first have knowledge of the disability and its relationship to your employment?   Witnesses to the Accident (if applicable)  yes- Doesn't know name      Nature of Injury or Occupational Disease  Workers' Compensation  Part(s) of Body Injured " or Affected  Facial Bones Elbow (L) N/A    I CERTIFY THAT THE ABOVE IS TRUE AND CORRECT TO T HE BEST OF MY KNOWLEDGE AND THAT I HAVE PROVIDED THIS INFORMATION IN ORDER TO OBTAIN THE BENEFITS OF [place]CC[place], OR CHAPTER [place]).  I HEREBY AUTHORIZE ANY PHYSICIAN, CHIROPRACTOR, SURGEON, PRACTITIONER OR ANY OTHER PERSON, ANY HOSPITAL, INCLUDING [facility] OR [facility], ANY  MEDICAL SERVICE ORGANIZATION, ANY INSURANCE COMPANY, OR OTHER INSTITUTION OR ORGANIZATION TO RELEASE TO EACH OTHER, ANY MEDICAL OR OTHER INFORMATION, INCLUDING BENEFITS PAID OR PAYABLE, PERTINENT TO THIS INJURY OR DISEASE, EXCEPT INFORMATION RELATIVE TO DIAGNOSIS, TREATMENT AND/OR COUNSELING FOR AIDS, PSYCHOLOGICAL CONDITIONS, ALCOHOL OR CONTROLLED SUBSTANCES, FOR WHICH I MUST GIVE SPECIFIC AUTHORIZATION.  A PHOTOSTAT OF THIS AUTHORIZATION SHALL BE VALID AS THE ORIGINAL.     Date 04/14/2025 [facility] Employee’s Original or  *Electronic Signature   THIS REPORT MUST BE COMPLETED AND MAILED WITHIN 3 WORKING DAYS OF TREATMENT   Place  [facility]    Name of Facility  [facility]   Date 4/14/2025 Diagnosis and Description of Injury or Occupational Disease  (S50.02XA) Contusion of left elbow, initial encounter  (primary encounter diagnosis)  (M25.522) Left elbow pain  (S00.83XA) Contusion of left jaw region  (Y99.0) Work related injury  The primary encounter diagnosis was Contusion of left elbow, initial encounter. Diagnoses of Left elbow pain, Contusion of left jaw region, and Work related injury were also pertinent to this visit. Is there evidence that the injured employee was under the influence of alcohol and/or another controlled substance at the time of accident?  []No  [] Yes (if yes, please explain)   Hour 1:34 PM  No   Treatment: · Discussed with patient/family Dx,  DDx, management options (risks,benefits, and  alternatives to planned treatment), natural progression.  I did go over the x-rays with patient.  · Questions were encouraged and answered  · Supportive care measures were discussed including the following -   · RICE  · Tylenol and ibuprofen for pain and inflammation, discussed appropriate dosages.  · Ace wrap to left elbow, rewrap as needed  ·  Instructed patient ongoing assessment of color, movement, sensation of digits  · Discussed red flags and reasons return to urgent care versus when to go to the emergency room for increased pain, loss of color, movement, or  sensation to the digits, any increased redness, swelling, localized heat, especially if there is fever, chills, nausea and vomiting, lethargy.    · Will place her on work restrictions, no use of left arm  · Patient to return for follow-up evaluation in 1 week  · Worker's Compensation paperwork C4 D39 completed, see scanned paperwork for details  · Patient states she has good understanding of all instructions and is agreeable with the plan of care.      Have you advised the patient to remain off work five days or more?   No  [] Yes  If yes, indicate dates: From_ _                                                      To __ _  [] No   If no, is the injured employee capable of: [] full duty No   [] modified duty Yes    If modified duty, specify any limitations / restrictions:__________________  ___No use of left arm, must be able to wear Ace wrap on left elbow while at work___________________________     X-Ray Findings: Negative    From information given by the employee, together with medical evidence, can you directly connect this injury or occupational disease as job incurred?  []Yes   [] No Yes    Is additional medical care by a physician indicated? []Yes [] No  Yes  Comments:Patient to follow-up in 1 week    Do you know of any previous injury or disease contributing to this condition or occupational disease? []Yes [] No (Explain if yes)                           No   Date  4/14/2025 Print Health Care Provider’s Name  KIERAN Nielsen I certify that the employer’s copy of  this form was delivered to the employer on:   Address  [address] [facility] [address] [address] INSURER'S USE ONLY                       [facility] [place] [facility] Zip  [address] Provider’s Tax ID Number  [id number]   Telephone  Dept: [phone]    Health Care Provider’s Original or Electronic Signature      e-ANNITA Blackmon    Degree (MD,DO, DC,PA-C,APRN)  APRN  Choose (if applicable)      ORIGINAL - TREATING HEALTHCARE PROVIDER PAGE 2 - INSURER/TPA PAGE 3 - EMPLOYER PAGE 4 - EMPLOYEE             Form C-4 (rev.02/25)

## 2025-04-16 ENCOUNTER — TELEPHONE (OUTPATIENT)
Dept: OCCUPATIONAL MEDICINE | Facility: CLINIC | Age: 60
End: 2025-04-16
Payer: COMMERCIAL

## 2025-04-17 ENCOUNTER — OCCUPATIONAL MEDICINE (OUTPATIENT)
Dept: OCCUPATIONAL MEDICINE | Facility: CLINIC | Age: 60
End: 2025-04-17
Payer: COMMERCIAL

## 2025-04-17 VITALS
SYSTOLIC BLOOD PRESSURE: 130 MMHG | OXYGEN SATURATION: 96 % | BODY MASS INDEX: 34.51 KG/M2 | TEMPERATURE: 97 F | HEART RATE: 95 BPM | DIASTOLIC BLOOD PRESSURE: 82 MMHG | HEIGHT: 69 IN | RESPIRATION RATE: 18 BRPM | WEIGHT: 233 LBS

## 2025-04-17 DIAGNOSIS — S00.83XA CONTUSION OF LEFT JAW REGION: ICD-10-CM

## 2025-04-17 DIAGNOSIS — S50.02XD CONTUSION OF LEFT ELBOW, SUBSEQUENT ENCOUNTER: ICD-10-CM

## 2025-04-17 PROCEDURE — 3079F DIAST BP 80-89 MM HG: CPT | Performed by: PREVENTIVE MEDICINE

## 2025-04-17 PROCEDURE — 3075F SYST BP GE 130 - 139MM HG: CPT | Performed by: PREVENTIVE MEDICINE

## 2025-04-17 PROCEDURE — 1125F AMNT PAIN NOTED PAIN PRSNT: CPT | Performed by: PREVENTIVE MEDICINE

## 2025-04-17 PROCEDURE — 99203 OFFICE O/P NEW LOW 30 MIN: CPT | Performed by: PREVENTIVE MEDICINE

## 2025-04-17 ASSESSMENT — PAIN SCALES - GENERAL: PAINLEVEL_OUTOF10: 6=MODERATE PAIN

## 2025-04-17 NOTE — LETTER
PHYSICIAN’S AND CHIROPRACTIC PHYSICIAN'S   PROGRESS REPORT   CERTIFICATION OF DISABILITY Claim Number:     Social Security Number:    Patient’s Name: Xochitl Martinez Date of Injury: 4/13/2025   Employer: [employer] [facility] Name of MCO (if applicable):      Patient’s Job Description/Occupation: [employer] 2       Previous Injuries/Diseases/Surgeries Contributing to the Condition:         Diagnosis: (S50.02XD) Contusion of left elbow, subsequent encounter  (S00.83XA) Contusion of left jaw region      Related to the Industrial Injury? Yes     Explain:        Objective Medical Findings: Left Elbow: Well healing abrasion.  Tenderness diffusely over the elbow.  Full range of motion.  Strength intact.         None - Discharged                         Stable  No                 Ratable  No     X   Generally Improved                         Condition Worsened                  Condition Same  May Have Suffered a Permanent Disability No     Treatment Plan:    Will continue conservative management  Will lessen work restrictions  OTC ibuprofen/Tylenol as needed  Use heat/ice as needed  If symptoms continue to improve will consider released to full duty at next visit         No Change in Therapy                  PT/OT Prescribed                      Medication May be Used While Working        Case Management                          PT/OT Discontinued    Consultation    Further Diagnostic Studies:    Prescription(s)                 Released to FULL DUTY /No Restrictions on (Date):       Certified TOTALLY TEMPORARILY DISABLED (Indicate Dates) From:   To:    X  Released to RESTRICTED/Modified Duty on (Date): From: 4/17/2025 To: 4/24/2025  Restrictions Are:  Temporary      No Sitting    No Standing    No Pulling Other: Limit left arm to less than 10 pounds lift/push/pull.       No Bending at Waist     No Stooping     No Lifting        No Carrying     No Walking Lifting Restricted to (lbs.):          No  Pushing        No Climbing     No Reaching Above Shoulders       Date of Next Visit:  Thursday 4/24/2025  @ 11:15 AM Date of this Exam: 4/17/2025 Physician/Chiropractic Physician Name: Speedy Marquez D.O. Physician/Chiropractic Physician Signature:  Speedy Marquez DO MPH     [place]:  [address] [place] [address], [address] [place], [place] [address] - Telephone [phone] [place]:  [address] [place] [address], [address] [place], [place] [address] - Telephone [phone]    [email or web address]  D-39 (Rev. 10/24)

## 2025-04-17 NOTE — PROGRESS NOTES
"Subjective:     Xochitl Martinez is a 59 y.o. 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  She states the pain at the left elbow is much better.  She states she saw some soreness and tenderness but feels like she can use her arm pretty well.  She states the bruise over her left jaw has gone down as well.  She has been using heat and ice for the elbow.  Has been working light duty.  Feels close to being able to return to her normal job.    ROS: All systems were reviewed on intake form, form was reviewed and signed. See scanned documents in media. Pertinent positives and negatives included in HPI.    PMH: No pertinent past medical history to this problem  MEDS: Medications were reviewed in Epic  ALLERGIES: No Known Allergies  SOCHX: Works as a  at [employer]  FH: No pertinent family history to this problem       Objective:     /82   Pulse 95   Temp 36.1 °C (97 °F) (Temporal)   Resp 18   Ht 1.753 m (5' 9\")   Wt 106 kg (233 lb)   SpO2 96%   BMI 34.41 kg/m²     Constitutional: Patient is in no acute distress. Appears well-developed and well-nourished.   Cardiovascular: Normal rate.    Pulmonary/Chest: Effort normal. No respiratory distress.   Neurological: Patient is alert and oriented to person, place, and time.   Skin: Skin is warm and dry.   Psychiatric: Normal mood and affect. Behavior is normal.     Left Elbow: Well healing abrasion.  Tenderness diffusely over the elbow.  Full range of motion.  Strength intact.    Assessment/Plan:     1. Contusion of left elbow, subsequent encounter    2. Contusion of left jaw region      Will continue conservative management  Will lessen work restrictions  OTC ibuprofen/Tylenol as needed  Use " heat/ice as needed  If symptoms continue to improve will consider released to full duty at next visit    Work Status: Restricted Duty - see D-39 or other state/federal worker's compensation forms for specific restrictions if applicable  Follow up 1 weeks    Differential diagnosis, natural history, supportive care, and indications for immediate follow-up discussed.

## 2025-04-23 ENCOUNTER — TELEPHONE (OUTPATIENT)
Dept: OCCUPATIONAL MEDICINE | Facility: CLINIC | Age: 60
End: 2025-04-23
Payer: COMMERCIAL

## 2025-04-24 ENCOUNTER — OCCUPATIONAL MEDICINE (OUTPATIENT)
Dept: OCCUPATIONAL MEDICINE | Facility: CLINIC | Age: 60
End: 2025-04-24
Payer: COMMERCIAL

## 2025-04-24 VITALS
WEIGHT: 234 LBS | SYSTOLIC BLOOD PRESSURE: 132 MMHG | DIASTOLIC BLOOD PRESSURE: 82 MMHG | HEART RATE: 76 BPM | OXYGEN SATURATION: 97 % | RESPIRATION RATE: 18 BRPM | TEMPERATURE: 97.2 F | HEIGHT: 69 IN | BODY MASS INDEX: 34.66 KG/M2

## 2025-04-24 DIAGNOSIS — S50.02XD CONTUSION OF LEFT ELBOW, SUBSEQUENT ENCOUNTER: ICD-10-CM

## 2025-04-24 DIAGNOSIS — S00.83XA CONTUSION OF LEFT JAW REGION: ICD-10-CM

## 2025-04-24 PROCEDURE — 99213 OFFICE O/P EST LOW 20 MIN: CPT | Performed by: PREVENTIVE MEDICINE

## 2025-04-24 ASSESSMENT — PAIN SCALES - GENERAL: PAINLEVEL_OUTOF10: 4=SLIGHT-MODERATE PAIN

## 2025-04-24 NOTE — PROGRESS NOTES
"Subjective:     Xochitl Martinez is a 59 y.o. 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  She states the pain at the left elbow is much better.  She states she saw some soreness and tenderness but feels like she can use her arm pretty well.  She states the bruise over her left jaw has gone down as well.  She has been using heat and ice for the elbow.  Has been working light duty.  Feels close to being able to return to her normal job.    4/24/2025: Patient states that overall left elbow pain has been improving.  She denies any radiating pain, numbness or tingling.  She states she has been using a elbow wrap which has been helping.  She does feel comfortable trying full duty next week if able.  She has noted onset of headaches mostly on the side where she was struck.  These are relieved with ibuprofen.  Gets these headaches about every other day.    ROS    SOCHX: Works as a  at [employer]   FH: No pertinent family history to this problem.       Objective:     /82   Pulse 76   Temp 36.2 °C (97.2 °F) (Temporal)   Resp 18   Ht 1.753 m (5' 9\")   Wt 106 kg (234 lb)   SpO2 97%   BMI 34.56 kg/m²     Constitutional: Patient is in no acute distress. Appears well-developed and well-nourished.   Cardiovascular: Normal rate.    Pulmonary/Chest: Effort normal. No respiratory distress.   Neurological: Patient is alert and oriented to person, place, and time.   Skin: Skin is warm and dry.   Psychiatric: Normal mood and affect. Behavior is normal.     Left Elbow: Well healing abrasion.  Tenderness diffusely over the elbow.  Full range of motion.  Strength intact.    Assessment/Plan:     1. Contusion of left elbow, subsequent encounter    2. " Contusion of left jaw region      Noted some improvement  Will do trial of full duty starting Tuesday, April 28  Recommend ibuprofen/Tylenol as needed for headaches.  Continue elbow brace as needed    Work Status: Restricted duty until Monday, April 27.  Okay to begin full duty April 28 see D-39 or other state/federal worker's compensation forms for specific restrictions if applicable  Follow up 2 weeks    Differential diagnosis, natural history, supportive care, and indications for immediate follow-up discussed.

## 2025-04-24 NOTE — LETTER
PHYSICIAN’S AND CHIROPRACTIC PHYSICIAN'S   PROGRESS REPORT   CERTIFICATION OF DISABILITY Claim Number:     Social Security Number:    Patient’s Name: Xochitl Martinez Date of Injury: 4/13/2025   Employer: [employer] [facility] Name of MCO (if applicable):      Patient’s Job Description/Occupation: [employer] 2       Previous Injuries/Diseases/Surgeries Contributing to the Condition:         Diagnosis: (S50.02XD) Contusion of left elbow, subsequent encounter  (S00.83XA) Contusion of left jaw region      Related to the Industrial Injury? Yes     Explain:        Objective Medical Findings: Left Elbow: Well healing abrasion.  Tenderness diffusely over the elbow.  Full range of motion.  Strength intact.         None - Discharged                         Stable  No                 Ratable  No     X   Generally Improved                         Condition Worsened               X   Condition Same  May Have Suffered a Permanent Disability No     Treatment Plan:    Noted some improvement  Will do trial of full duty starting Tuesday, April 28  Recommend ibuprofen/Tylenol as needed for headaches.  Continue elbow brace as needed         No Change in Therapy                  PT/OT Prescribed                      Medication May be Used While Working        Case Management                          PT/OT Discontinued    Consultation    Further Diagnostic Studies:    Prescription(s)               X  Released to FULL DUTY /No Restrictions on (Date):  4/29/2025    Certified TOTALLY TEMPORARILY DISABLED (Indicate Dates) From:   To:    X  Released to RESTRICTED/Modified Duty on (Date): From: 4/24/2025 To: 4/28/2025  Restrictions Are:  Temporary      No Sitting    No Standing    No Pulling Other:  Limit left arm to less than 10 pounds lift/push/pull.       No Bending at Waist     No Stooping     No Lifting        No Carrying     No Walking Lifting Restricted to (lbs.):  < or = to 10 pounds       No Pushing        No  Climbing     No Reaching Above Shoulders       Date of Next Visit:  Thursday 5/8/2025  @ 11:00 AM Date of this Exam: 4/24/2025 Physician/Chiropractic Physician Name: Speedy Marquez D.O. Physician/Chiropractic Physician Signature:  Speedy Marquez DO MPH     [place]:  [address] [place] [address], [address] [place], [place] [address] - Telephone [phone] [place]:  [address] [place] [address], [address] [place], [place] [address] - Telephone [phone]    [email or web address]  D-39 (Rev. 10/24)

## 2025-05-08 ENCOUNTER — OCCUPATIONAL MEDICINE (OUTPATIENT)
Dept: OCCUPATIONAL MEDICINE | Facility: CLINIC | Age: 60
End: 2025-05-08
Payer: COMMERCIAL

## 2025-05-08 VITALS
SYSTOLIC BLOOD PRESSURE: 128 MMHG | BODY MASS INDEX: 38.99 KG/M2 | WEIGHT: 234 LBS | OXYGEN SATURATION: 98 % | HEIGHT: 65 IN | RESPIRATION RATE: 16 BRPM | TEMPERATURE: 97.4 F | DIASTOLIC BLOOD PRESSURE: 84 MMHG | HEART RATE: 77 BPM

## 2025-05-08 DIAGNOSIS — S00.83XA CONTUSION OF LEFT JAW REGION: ICD-10-CM

## 2025-05-08 DIAGNOSIS — S50.02XD CONTUSION OF LEFT ELBOW, SUBSEQUENT ENCOUNTER: ICD-10-CM

## 2025-05-08 PROCEDURE — 99213 OFFICE O/P EST LOW 20 MIN: CPT | Performed by: PREVENTIVE MEDICINE

## 2025-05-08 PROCEDURE — 3079F DIAST BP 80-89 MM HG: CPT | Performed by: PREVENTIVE MEDICINE

## 2025-05-08 PROCEDURE — 3074F SYST BP LT 130 MM HG: CPT | Performed by: PREVENTIVE MEDICINE

## 2025-05-08 NOTE — PROGRESS NOTES
"Subjective:     Xochitl Martinez is a 59 y.o. 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  She states the pain at the left elbow is much better.  She states she saw some soreness and tenderness but feels like she can use her arm pretty well.  She states the bruise over her left jaw has gone down as well.  She has been using heat and ice for the elbow.  Has been working light duty.  Feels close to being able to return to her normal job.     4/24/2025: Patient states that overall left elbow pain has been improving.  She denies any radiating pain, numbness or tingling.  She states she has been using a elbow wrap which has been helping.  She does feel comfortable trying full duty next week if able.  She has noted onset of headaches mostly on the side where she was struck.  These are relieved with ibuprofen.  Gets these headaches about every other day.    5/8/2025: Patient states overall symptoms have improved a little bit.  She states she has been doing full duty with minimal difficulty.  She still does get occasional swelling and pain at the elbow especially the medial elbow.  She states that she does get occasional tingling in the hand but overall has had improvement.    ROS    SOCHX: Works as a  at [employer]   FH: No pertinent family history to this problem.       Objective:     /84   Pulse 77   Temp 36.3 °C (97.4 °F) (Temporal)   Resp 16   Ht 1.651 m (5' 5\")   Wt 106 kg (234 lb)   SpO2 98%   BMI 38.94 kg/m²     Constitutional: Patient is in no acute distress. Appears well-developed and well-nourished.   Cardiovascular: Normal rate.    Pulmonary/Chest: Effort normal. No respiratory distress. "   Neurological: Patient is alert and oriented to person, place, and time.   Skin: Skin is warm and dry.   Psychiatric: Normal mood and affect. Behavior is normal.     Left Elbow: Well healing abrasion.  Mild tenderness diffusely over the elbow.  Full range of motion.  Strength intact.    Assessment/Plan:     1. Contusion of left elbow, subsequent encounter    2. Contusion of left jaw region      Continues with mild symptoms  Recommend ibuprofen/Tylenol as needed for headaches.  Continue elbow brace as needed  Continue full duty  Will follow up in one month if symptoms continue to improve will release from care. If symptoms worse will consider further diagnostics      Work Status: Restricted Duty - see D-39 or other state/federal worker's compensation forms for specific restrictions if applicable  Follow up 4 weeks    Differential diagnosis, natural history, supportive care, and indications for immediate follow-up discussed.

## 2025-05-08 NOTE — LETTER
PHYSICIAN’S AND CHIROPRACTIC PHYSICIAN'S   PROGRESS REPORT   CERTIFICATION OF DISABILITY Claim Number:     Social Security Number:    Patient’s Name: Xochitl Martinez Date of Injury: 4/13/2025   Employer: [employer] [facility] Name of MCO (if applicable):      Patient’s Job Description/Occupation: SMO 2       Previous Injuries/Diseases/Surgeries Contributing to the Condition:         Diagnosis: (S50.02XD) Contusion of left elbow, subsequent encounter  (S00.83XA) Contusion of left jaw region      Related to the Industrial Injury? Yes     Explain:        Objective Medical Findings: Left Elbow: Well healing abrasion.  Mild tenderness diffusely over the elbow.  Full range of motion.  Strength intact.         None - Discharged                         Stable  No                 Ratable  No        Generally Improved                         Condition Worsened               X   Condition Same  May Have Suffered a Permanent Disability No     Treatment Plan:    Continues with mild symptoms  Recommend ibuprofen/Tylenol as needed for headaches.  Continue elbow brace as needed  Continue full duty  Will follow up in one month if symptoms continue to improve will release from care. If symptoms worse will consider further diagnostics           No Change in Therapy                  PT/OT Prescribed                      Medication May be Used While Working        Case Management                          PT/OT Discontinued    Consultation    Further Diagnostic Studies:    Prescription(s)               X  Released to FULL DUTY /No Restrictions on (Date):  5/8/2025    Certified TOTALLY TEMPORARILY DISABLED (Indicate Dates) From:   To:      Released to RESTRICTED/Modified Duty on (Date): From:   To:    Restrictions Are:         No Sitting    No Standing    No Pulling Other:         No Bending at Waist     No Stooping     No Lifting        No Carrying     No Walking Lifting Restricted to (lbs.):          No  Pushing        No Climbing     No Reaching Above Shoulders       Date of Next Visit:  6/5/2025  @11 am  Date of this Exam: 5/8/2025 Physician/Chiropractic Physician Name: Speedy Marquez D.O. Physician/Chiropractic Physician Signature:  Speedy Marquez DO MPH     [place]:  [facility]  [facility], [address] [place], [place] [address] - Telephone [phone] [place]:  [address] [place] [address], [address] [place], [place] [address] - Telephone [phone]    [email or web address]  D-39 (Rev. 10/24)

## 2025-06-05 ENCOUNTER — OCCUPATIONAL MEDICINE (OUTPATIENT)
Dept: OCCUPATIONAL MEDICINE | Facility: CLINIC | Age: 60
End: 2025-06-05
Payer: COMMERCIAL

## 2025-06-05 VITALS
HEART RATE: 86 BPM | HEIGHT: 64 IN | WEIGHT: 234 LBS | BODY MASS INDEX: 39.95 KG/M2 | OXYGEN SATURATION: 92 % | RESPIRATION RATE: 16 BRPM | TEMPERATURE: 98.2 F

## 2025-06-05 DIAGNOSIS — S00.83XA CONTUSION OF LEFT JAW REGION: ICD-10-CM

## 2025-06-05 DIAGNOSIS — S50.02XD CONTUSION OF LEFT ELBOW, SUBSEQUENT ENCOUNTER: Primary | ICD-10-CM

## 2025-06-05 PROCEDURE — 99213 OFFICE O/P EST LOW 20 MIN: CPT | Performed by: PREVENTIVE MEDICINE

## 2025-06-05 NOTE — PROGRESS NOTES
Continue to follow with activity Subjective:   Xochitl Martinez is a 60 y.o. female who presents for Follow-Up      Date of Injury: 4/13/2025   Industrial Injury: Yes , Comments: I was walking in the cafeteria and I tripped on a drainage hole that wasn't properly cover  Previous Injuries/Diseases/Surgeries Contributing to the Condition:      4/17/2025: Patient states overall symptoms are improving significantly.  She states the pain at the left elbow is much better.  She states she saw some soreness and tenderness but feels like she can use her arm pretty well.  She states the bruise over her left jaw has gone down as well.  She has been using heat and ice for the elbow.  Has been working light duty.  Feels close to being able to return to her normal job.     4/24/2025: Patient states that overall left elbow pain has been improving.  She denies any radiating pain, numbness or tingling.  She states she has been using a elbow wrap which has been helping.  She does feel comfortable trying full duty next week if able.  She has noted onset of headaches mostly on the side where she was struck.  These are relieved with ibuprofen.  Gets these headaches about every other day.     5/8/2025: Patient states overall symptoms have improved a little bit.  She states she has been doing full duty with minimal difficulty.  She still does get occasional swelling and pain at the elbow especially the medial elbow.  She states that she does get occasional tingling in the hand but overall has had improvement.    6/5/2025: Patient states that overall symptoms are unchanged. Pain continues at medial elbow mostly. Worsened by frequent use of the left arm. Noted numbness and tingling throughout the entire hand. Pain is variable and ranges from 2 - 8/10. Toleration full duty with occasional flare ups of pain.     PMH:   Reviewed, see above  MEDS:  Medications were reviewed in EMR  ALLERGIES:  Allergies were reviewed in EMR  SOCHX:  Works as a SMO 2 at [employer]   FH:   No pertinent family history to this problem     Objective:   There were no vitals taken for this visit.    Constitutional: Patient is in no acute distress. Appears well-developed and well-nourished.   Cardiovascular: Normal rate.    Pulmonary/Chest: Effort normal. No respiratory distress.   Neurological: Patient is alert and oriented to person, place, and time.   Skin: Skin is warm and dry.   Psychiatric: Normal mood and affect. Behavior is normal.     Left Elbow:  Mild tenderness diffusely over the elbow.  Full range of motion.  Strength intact.    Assessment/Plan:     1. Contusion of left elbow, subsequent encounter  - Referral to Radiology  - MR-ELBOW-W/O LEFT; Future  - EMG/NCS; Future    2. Contusion of left jaw region      Released to Full Duty    Continues with persistent symptoms with numbness/tingling  Referral for MRI Left Elbow w/o and EMG/NCS left upper extremity  Recommend ibuprofen/Tylenol as needed  Continue elbow brace as needed  Continue full duty      Differential diagnosis, natural history, supportive care, and indications for immediate follow-up discussed.        Speedy Marquez D.O.

## 2025-06-05 NOTE — LETTER
PHYSICIAN’S AND CHIROPRACTIC PHYSICIAN'S   PROGRESS REPORT   CERTIFICATION OF DISABILITY Claim Number:     Social Security Number:    Patient’s Name: Xochitl Martinez Date of Injury: 4/13/2025   Employer: [employer] [facility] Name of MCO (if applicable):      Patient’s Job Description/Occupation: SMO 2       Previous Injuries/Diseases/Surgeries Contributing to the Condition:         Diagnosis: (S50.02XD) Contusion of left elbow, subsequent encounter  (primary encounter diagnosis)  (S00.83XA) Contusion of left jaw region      Related to the Industrial Injury? Yes     Explain:        Objective Medical Findings: Left Elbow:  Mild tenderness diffusely over the elbow.  Full range of motion.  Strength intact.         None - Discharged                         Stable  No                 Ratable  No        Generally Improved                         Condition Worsened               X   Condition Same  May Have Suffered a Permanent Disability No     Treatment Plan:     Continues with persistent symptoms with numbness/tingling  Referral for MRI Left Elbow w/o and EMG/NCS left upper extremity  Recommend ibuprofen/Tylenol as needed  Continue elbow brace as needed  Continue full duty           No Change in Therapy                  PT/OT Prescribed                      Medication May be Used While Working        Case Management                          PT/OT Discontinued    Consultation    Further Diagnostic Studies:    Prescription(s)               X  Released to FULL DUTY /No Restrictions on (Date):  6/5/2025    Certified TOTALLY TEMPORARILY DISABLED (Indicate Dates) From:   To:      Released to RESTRICTED/Modified Duty on (Date): From:   To:    Restrictions Are:         No Sitting    No Standing    No Pulling Other:         No Bending at Waist     No Stooping     No Lifting        No Carrying     No Walking Lifting Restricted to (lbs.):          No Pushing        No Climbing     No Reaching Above  Shoulders       Date of Next Visit:  7/10/2025  @11 am  Date of this Exam: 6/5/2025 Physician/Chiropractic Physician Name: Speedy Marquez D.O. Physician/Chiropractic Physician Signature:  Speedy Marquez DO MPH     [place]:  [facility]  [facility], [address] [place], [place] [address] - Telephone [phone] [place]:  [address] [place] [address], [address] [place], [place] [address] - Telephone [phone]    [email or web address]  D-39 (Rev. 10/24)

## 2025-06-09 ENCOUNTER — OCCUPATIONAL MEDICINE (OUTPATIENT)
Dept: URGENT CARE | Facility: CLINIC | Age: 60
End: 2025-06-09
Payer: COMMERCIAL

## 2025-06-09 VITALS
TEMPERATURE: 97.9 F | RESPIRATION RATE: 16 BRPM | HEART RATE: 79 BPM | SYSTOLIC BLOOD PRESSURE: 138 MMHG | BODY MASS INDEX: 33.21 KG/M2 | OXYGEN SATURATION: 98 % | DIASTOLIC BLOOD PRESSURE: 88 MMHG | HEIGHT: 70 IN | WEIGHT: 232 LBS

## 2025-06-09 DIAGNOSIS — S50.02XA CONTUSION OF LEFT ELBOW, INITIAL ENCOUNTER: ICD-10-CM
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
## 2025-06-09 DIAGNOSIS — M25.522 LEFT ELBOW PAIN: ICD-10-CM

## 2025-06-09 PROCEDURE — 3079F DIAST BP 80-89 MM HG: CPT

## 2025-06-09 PROCEDURE — 99213 OFFICE O/P EST LOW 20 MIN: CPT

## 2025-06-09 PROCEDURE — 3075F SYST BP GE 130 - 139MM HG: CPT

## 2025-06-09 RX ORDER — METHOCARBAMOL 500 MG/1
TABLET, FILM COATED ORAL
Qty: 15 TABLET | Refills: 0 | Status: SHIPPED | OUTPATIENT
Start: 2025-06-09

## 2025-06-09 NOTE — LETTER
PHYSICIAN’S AND CHIROPRACTIC PHYSICIAN'S   PROGRESS REPORT   CERTIFICATION OF DISABILITY Claim Number:     Social Security Number:    Patient’s Name: Xochitl Martinez Date of Injury:    Employer: [employer] [facility] Name of MCO (if applicable):      Patient’s Job Description/Occupation:        Previous Injuries/Diseases/Surgeries Contributing to the Condition:         Diagnosis: (Y99.0) Work related injury  (primary encounter diagnosis)  (M25.522) Left elbow pain  (S50.02XA) Contusion of left elbow, initial encounter      Related to the Industrial Injury? 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 Seen several times, she has an MRI scheduled.  She was seen on Thursday and states that over the course of the last several weeks she has gotten worse.  Noted increased pain that now radiates from her elbow to her upper arm. Brace in place      Objective Medical Findings: No redness or swelling of the left elbow, pain noted with palpation.  She has full ROM but notes pain especially with extension that goes up into her left bicep.  No numbness or tingling.  Full ROM of the fingers, hand, wrist, elbow and shoulder is noted.  No pain with bending of the arm noted, only extension.           None - Discharged                         Stable  No                 Ratable  No        Generally Improved                      X   Condition Worsened                  Condition Same  May Have Suffered a Permanent Disability No     Treatment Plan:    Continue brace, restrictions in place.  Will attempt Voltaren and robaxin       X   No Change in Therapy               X   PT/OT Prescribed                      Medication May be Used While Working        Case Management                          PT/OT Discontinued    Consultation    Further Diagnostic Studies:    Prescription(s)       She is awaiting an MRI     Robaxin and voltarin      Released to FULL DUTY /No Restrictions on (Date):       Certified TOTALLY TEMPORARILY DISABLED (Indicate Dates) From:   To:    X  Released to RESTRICTED/Modified Duty on (Date): From:   To:    Restrictions Are:  Temporary      No Sitting    No Standing X   No Pulling Other:         No Bending at Waist     No Stooping     No Lifting    X    No Carrying     No Walking Lifting Restricted to (lbs.):  < or = to 10 pounds    X   No Pushing     X   No Climbing     No Reaching Above Shoulders       Date of Next Visit:  6/16/2025 Date of this Exam: 6/9/2025 Physician/Chiropractic Physician Name: KIERAN Menjivar Physician/Chiropractic Physician Signature:  Speedy Marquez DO MPH     [place]:  [address] [place] [address], [address] [place], [place] [address] - Telephone [phone] [place]:  [address] [place] [address], [address] [place], [place] [address] - Telephone [phone]    [email or web address]  D-39 (Rev. 10/24)

## 2025-06-10 NOTE — PROGRESS NOTES
"Subjective:     Xochitl Martinez is a 60 y.o. 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 Seen several times, she has an MRI scheduled.  She was seen on Thursday and states that over the course of the last several weeks she has gotten worse.  Noted increased pain that now radiates from her elbow to her upper arm. Brace in place.         PMH:   No pertinent past medical history to this problem  MEDS:  Medications were reviewed in EMR  ALLERGIES:  Allergies were reviewed in EMR  SOCHX:  Works as a .   FH:   No pertinent family history to this problem       Objective:     /88   Pulse 79   Temp 36.6 °C (97.9 °F) (Temporal)   Resp 16   Ht 1.765 m (5' 9.5\")   Wt 105 kg (232 lb)   SpO2 98%   BMI 33.77 kg/m²     No redness or swelling of the left elbow, pain noted with palpation.  She has full ROM but notes pain especially with extension that goes up into her left bicep.  No numbness or tingling.  Full ROM of the fingers, hand, wrist, elbow and shoulder is noted.  No pain with bending of the arm noted, only extension.      Assessment/Plan:   No pushing, no pulling, no major heavy lifting.  Will attempt a muscle relaxer and volterin gel.  Referral to pt and follow up in one week.      1. Work related injury    2. Left elbow pain  - methocarbamol (ROBAXIN) 500 MG Tab; Take 1 to 2 tablets every 6 hours as needed for pain/muscle spasms.  Dispense: 15 Tablet; Refill: 0  - diclofenac sodium (VOLTAREN) 1 % Gel; Apply 2 g topically 4 times a day as needed (left elbow pain).  Dispense: 100 g; Refill: 0  - Referral to Physical Therapy    3. Contusion of left elbow, initial encounter  - Referral to Physical Therapy      FROM   TO            Differential diagnosis, natural history, supportive care, and indications for " immediate follow-up discussed.

## 2025-06-17 ENCOUNTER — TELEPHONE (OUTPATIENT)
Dept: OCCUPATIONAL MEDICINE | Facility: CLINIC | Age: 60
End: 2025-06-17
Payer: COMMERCIAL

## 2025-06-18 ENCOUNTER — OCCUPATIONAL MEDICINE (OUTPATIENT)
Dept: OCCUPATIONAL MEDICINE | Facility: CLINIC | Age: 60
End: 2025-06-18
Payer: COMMERCIAL

## 2025-06-18 VITALS
SYSTOLIC BLOOD PRESSURE: 140 MMHG | OXYGEN SATURATION: 97 % | RESPIRATION RATE: 16 BRPM | BODY MASS INDEX: 34.51 KG/M2 | HEART RATE: 88 BPM | HEIGHT: 69 IN | WEIGHT: 233 LBS | TEMPERATURE: 97 F | DIASTOLIC BLOOD PRESSURE: 88 MMHG

## 2025-06-18 DIAGNOSIS — M79.632 LEFT FOREARM PAIN: ICD-10-CM

## 2025-06-18 DIAGNOSIS — S50.02XD CONTUSION OF LEFT ELBOW, SUBSEQUENT ENCOUNTER: Primary | ICD-10-CM

## 2025-06-18 NOTE — Clinical Note
EMPLOYEE’S CLAIM FOR COMPENSATION/ REPORT OF INITIAL TREATMENT  FORM C-4  PLEASE TYPE OR PRINT    EMPLOYEE’S CLAIM - PROVIDE ALL INFORMATION REQUESTED   First Name                    VIKTORIYA Leung                  Last Name  Juan Pablo Martinez Birthdate                    1965                Sex  [x]Female Claim Number (Insurer’s Use Only)     Mailing Address  [address] Age  60 y.o. Height    Weight    Social Security Number     [facility] Zip  [address] Telephone  [phone] (home)    Email  No e-mail address on record    Primary Language Spoken  English    INSURER  *** THIRD-PARTY   [employer]   Employee's Occupation (Job Title) When Injury or Occupational Disease Occurred  [employer] [facility] [employer]    Employer's Name/Company Name  [employer]  Telephone  [phone]    Office Mail Address (Number and Street)  [address]     Date of Injury (if applicable) 4/13/2025               Hours Injury (if applicable)  9:00 AM am    pm Date Employer Notified  4/13/2025 Last Day of Work after Injury or Occupational Disease  4/14/2025 Supervisor to Whom Injury Reported  Nathanael   Address or Location of Accident (if applicable)  Work [1]   What were you doing at the time of accident? (if applicable)  Walking in the cafeteria    How did this injury or occupational disease occur? (Be specific and answer in detail. Use additional sheet if necessary)  I was walking in the cafeteria and I tripped on a drainage hole that wasn't properly cover   If you believe that you have an occupational disease, when did you first have knowledge of the disability and its relationship to your employment?   Witnesses to the Accident (if applicable)  yes- Doesn't know name      Nature of Injury or Occupational Disease  Workers' Compensation  Part(s) of Body Injured or Affected  Facial Bones Elbow (L) N/A     I CERTIFY THAT THE ABOVE IS TRUE AND CORRECT TO T HE BEST OF MY KNOWLEDGE AND THAT I HAVE PROVIDED THIS INFORMATION IN ORDER TO OBTAIN THE BENEFITS OF [place], OR CHAPTER [place]).  I HEREBY AUTHORIZE ANY PHYSICIAN, CHIROPRACTOR, SURGEON, PRACTITIONER OR ANY OTHER PERSON, ANY HOSPITAL, INCLUDING [facility] OR [facility], ANY  MEDICAL SERVICE ORGANIZATION, ANY INSURANCE COMPANY, OR OTHER INSTITUTION OR ORGANIZATION TO RELEASE TO EACH OTHER, ANY MEDICAL OR OTHER INFORMATION, INCLUDING BENEFITS PAID OR PAYABLE, PERTINENT TO THIS INJURY OR DISEASE, EXCEPT INFORMATION RELATIVE TO DIAGNOSIS, TREATMENT AND/OR COUNSELING FOR AIDS, PSYCHOLOGICAL CONDITIONS, ALCOHOL OR CONTROLLED SUBSTANCES, FOR WHICH I MUST GIVE SPECIFIC AUTHORIZATION.  A PHOTOSTAT OF THIS AUTHORIZATION SHALL BE VALID AS THE ORIGINAL.     Date   Place Employee’s Original or  *Electronic Signature   THIS REPORT MUST BE COMPLETED AND MAILED WITHIN 3 WORKING DAYS OF TREATMENT   Place  [facility]    Name of [facility]   Date 6/18/2025 Diagnosis and Description of Injury or Occupational Disease  (S50.02XD) Contusion of left elbow, subsequent encounter  (primary encounter diagnosis)  (M79.632) Left forearm pain  The primary encounter diagnosis was Contusion of left elbow, subsequent encounter. A diagnosis of Left forearm pain was also pertinent to this visit. Is there evidence that the injured employee was under the influence of alcohol and/or another controlled substance at the time of accident?  []No  [] Yes (if yes, please explain)   Hour 10:52 AM      Treatment:      Have you advised the patient to remain off work five days or more?      [] Yes  If yes, indicate dates: From_ _                                                      To __ _  [] No   If no, is the injured employee capable of: [] full duty     [] modified duty       If modified duty, specify any limitations / restrictions:__________________  ___ ___________________________     X-Ray Findings:      From information given by the employee, together with medical evidence, can you directly connect this injury or occupational disease as job incurred?  []Yes   [] No      Is additional medical care by a physician indicated? []Yes [] No       Do you know of any previous injury or disease contributing to this condition or occupational disease? []Yes [] No (Explain if yes)                              Date  6/18/2025 Print Health Care Provider’s Name  KIERAN Menjivar I certify that the employer’s copy of  this form was delivered to the employer on:   Address  [address] [place] [address],   [address] INSURER'S USE ONLY                       [facility] Zip  [address] Provider’s Tax ID Number  [id number]   Telephone  Dept: [phone]    Health Care Provider’s Original or Electronic Signature      e-NICOLE Reynaga    Degree (MD,DO, DC,PA-C,APRN)  {Provider Degrees:56605}  Choose (if applicable)      ORIGINAL - TREATING HEALTHCARE PROVIDER PAGE 2 - INSURER/TPA PAGE 3 - EMPLOYER PAGE 4 - EMPLOYEE             Form C-4 (rev.02/25)

## 2025-06-18 NOTE — PROGRESS NOTES
"Subjective:     Xochitl Martinez is a 60 y.o. 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 Seen several times, she has an MRI scheduled.  She was seen on Thursday and states that over the course of the last several weeks she has gotten worse.  Noted increased pain that now radiates from her elbow to her upper arm. Brace in place.         Has MRI ordered, not scheduled, PT ordered and muscle relaxer provided during her last appointment.      PMH:   No pertinent past medical history to this problem  MEDS:  Medications were reviewed in EMR  ALLERGIES:  Allergies were reviewed in EMR  SOCHX:  Works as a   FH:   No pertinent family history to this problem       Objective:     BP (!) 140/88   Pulse 88   Temp 36.1 °C (97 °F) (Temporal)   Resp 16   Ht 1.753 m (5' 9\")   Wt 106 kg (233 lb)   SpO2 97%   BMI 34.41 kg/m²      Complaints of ongoing pain to the left upper arm with movement of the elbow, lift of the arm laterally and above the head. No redness, no swelling, she has full ROM but notes pain.  No numbness, some tingling that is intermittent.  Full ROM of her fingers, hand and wrist as well as her shoulder.      Assessment/Plan:   No pushing, no pulling, no heavy lifting greater than 10 pounds.  Follow-up in 1-1/2 weeks in an effort to ensure she schedules her MRI and hopefully her physical therapy as well.  Patient was provided with information regarding her MRI and scheduling as well as physical therapy. Total time spent with the patient 35 minutes to include, review of chart, charting, assessment, procedure.    1. Contusion of left elbow, subsequent encounter    2. Left forearm pain      FROM   TO    MRI needs to be scheduled patient provided with the number to call.  Awaiting PT as well.  Patient encouraged to move the limb.     "     Differential diagnosis, natural history, supportive care, and indications for immediate follow-up discussed.

## 2025-06-18 NOTE — LETTER
PHYSICIAN’S AND CHIROPRACTIC PHYSICIAN'S   PROGRESS REPORT   CERTIFICATION OF DISABILITY Claim Number:     Social Security Number:    Patient’s Name: Xochitl Martinez Date of Injury: 4/13/2025   Employer: [employer] [facility] Name of MCO (if applicable):      Patient’s Job Description/Occupation: SMO 2       Previous Injuries/Diseases/Surgeries Contributing to the Condition:  None       Diagnosis: (S50.02XD) Contusion of left elbow, subsequent encounter  (primary encounter diagnosis)  (M79.632) Left forearm pain      Related to the Industrial Injury? 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 Seen several times, she has an MRI scheduled.  She was seen on Thursday and states that over the course of the last several weeks she has gotten worse.  Noted increased pain that now radiates from her elbow to her upper arm. Brace in place.       Has MRI ordered, not scheduled, PT ordered and muscle relaxer provided during her last appointment.       Complaints of ongoing pain to the left upper arm with movement of the elbow, lift of the arm laterally and above the head.         Objective Medical Findings:  Complaints of ongoing pain to the left upper arm with movement of the elbow, lift of the arm laterally and above the head. No redness, no swelling, she has full ROM but notes pain.  No numbness, some tingling that is intermittent.  Full ROM of her fingers, hand and wrist as well as her shoulder.           None - Discharged                         Stable  Yes                 Ratable  No        Generally Improved                         Condition Worsened               X   Condition Same  May Have Suffered a Permanent Disability No     Treatment Plan:    Awaiting MRI, referral placed for physical during her last visit.  She does get relief with her robaxin that  she only uses at night.           No Change in Therapy               X   PT/OT Prescribed                      Medication May be Used While Working        Case Management                          PT/OT Discontinued    Consultation    Further Diagnostic Studies:    Prescription(s)                 Released to FULL DUTY /No Restrictions on (Date):       Certified TOTALLY TEMPORARILY DISABLED (Indicate Dates) From:   To:    X  Released to RESTRICTED/Modified Duty on (Date): From: 6/18/2025  To: 7/10/2025   Restrictions Are:         No Sitting    No Standing X   No Pulling Other: MRI needs to be scheduled patient provided with the number to call.  Awaiting PT as well.  Patient encouraged to move the limb.         No Bending at Waist     No Stooping     No Lifting        No Carrying     No Walking Lifting Restricted to (lbs.):  < or = to 10 pounds    X   No Pushing        No Climbing     No Reaching Above Shoulders       Date of Next Visit:  Thursday  7/10/2025  @ 11:00 AM Date of this Exam: 6/18/2025 Physician/Chiropractic Physician Name: KIERAN Menjivar Physician/Chiropractic Physician Signature:  Speedy Marquez DO MPH     [place]:  [address] [place] [address], [address] [place], [place] [address] - Telephone [phone] [place]:  [address] [place] [address], [address] [place], [place] [address] - Telephone [phone]    [email or web address]  D-39 (Rev. 10/24)

## 2025-07-02 ENCOUNTER — OCCUPATIONAL MEDICINE (OUTPATIENT)
Dept: URGENT CARE | Facility: CLINIC | Age: 60
End: 2025-07-02
Payer: COMMERCIAL

## 2025-07-02 VITALS
OXYGEN SATURATION: 95 % | WEIGHT: 233 LBS | SYSTOLIC BLOOD PRESSURE: 112 MMHG | RESPIRATION RATE: 18 BRPM | HEIGHT: 69 IN | TEMPERATURE: 97.6 F | HEART RATE: 89 BPM | BODY MASS INDEX: 34.51 KG/M2 | DIASTOLIC BLOOD PRESSURE: 70 MMHG

## 2025-07-02 DIAGNOSIS — S50.02XS CONTUSION OF LEFT ELBOW, SEQUELA: ICD-10-CM
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
## 2025-07-02 DIAGNOSIS — M25.522 LEFT ELBOW PAIN: ICD-10-CM

## 2025-07-02 PROCEDURE — 3078F DIAST BP <80 MM HG: CPT

## 2025-07-02 PROCEDURE — 99213 OFFICE O/P EST LOW 20 MIN: CPT

## 2025-07-02 PROCEDURE — 3074F SYST BP LT 130 MM HG: CPT

## 2025-07-02 ASSESSMENT — ENCOUNTER SYMPTOMS
EYE DISCHARGE: 0
BLOOD IN STOOL: 0
WHEEZING: 0
FEVER: 0
VOMITING: 0
COUGH: 0
CONSTIPATION: 0
EYE REDNESS: 0
DIARRHEA: 0
BRUISES/BLEEDS EASILY: 0
PAIN: 1
MYALGIAS: 1

## 2025-07-02 NOTE — PROGRESS NOTES
"Subjective:     Xochitl Martinez is a 60 y.o. female who presents for Follow-Up ([facility], needing a new MRI order for L elbow )      Pain  This is a recurrent problem. Associated symptoms include myalgias. Pertinent negatives include no congestion, coughing, fever, rash or vomiting.       Review of Systems   Constitutional:  Negative for fever.   HENT:  Negative for congestion and ear discharge.    Eyes:  Negative for discharge and redness.   Respiratory:  Negative for cough and wheezing.    Gastrointestinal:  Negative for blood in stool, constipation, diarrhea and vomiting.   Genitourinary:  Negative for frequency and hematuria.   Musculoskeletal:  Positive for joint pain and myalgias.   Skin:  Negative for itching and rash.   Endo/Heme/Allergies:  Does not bruise/bleed easily.        CURRENT MEDICATIONS:  diclofenac sodium Gel  erythromycin Oint  meloxicam  methocarbamol Tabs  methylPREDNISolone Tbpk  phenazopyridine Tabs    Allergies:   Allergies[1]    Current Problems: Xochitl Martinez does not have a problem list on file.  Past Surgical Hx:  No past surgical history on file.   Past Social Hx:  reports that she has never smoked. She has never used smokeless tobacco. She reports current alcohol use. She reports that she does not currently use drugs.   Past Family Hx:  Xochitl Martinez family history is not on file.     (Allergies, Medications, & Tobacco/Substance Use were reconciled by the Medical Assistant and reviewed by myself. The family history is prepopulated)       Objective:     /70   Pulse 89   Temp 36.4 °C (97.6 °F)   Resp 18   Ht 1.753 m (5' 9\")   Wt 106 kg (233 lb)   SpO2 95%   BMI 34.41 kg/m²     Physical Exam  Constitutional:       General: She is not in acute distress.     Appearance: Normal appearance. She is not ill-appearing, toxic-appearing or diaphoretic.   HENT:      Head: Normocephalic and atraumatic.      Nose: Nose normal.   Eyes:      General: No scleral icterus.   "      Right eye: No discharge.         Left eye: No discharge.   Cardiovascular:      Rate and Rhythm: Normal rate and regular rhythm.      Heart sounds: Normal heart sounds. No murmur heard.     No friction rub. No gallop.   Pulmonary:      Effort: Pulmonary effort is normal. No respiratory distress.      Breath sounds: No stridor. No wheezing, rhonchi or rales.   Chest:      Chest wall: No tenderness.   Abdominal:      General: Abdomen is flat.      Palpations: Abdomen is soft.   Musculoskeletal:         General: Tenderness present. No swelling, deformity or signs of injury. Normal range of motion.      Cervical back: No rigidity.   Skin:     General: Skin is warm and dry.   Neurological:      General: No focal deficit present.      Mental Status: She is alert and oriented to person, place, and time. Mental status is at baseline.   Psychiatric:         Behavior: Behavior normal.         Judgment: Judgment normal.       Assessment/Plan:     Xochitl was seen today for follow-up.    Diagnoses and all orders for this visit:    Work related injury  -     MR-ELBOW-W/O LEFT; Future  -     MR-HUMERUS W/O LEFT; Future  -     Referral to Physical Therapy    Left elbow pain  -     MR-ELBOW-W/O LEFT; Future  -     MR-HUMERUS W/O LEFT; Future  -     Referral to Physical Therapy    Contusion of left elbow, sequela  -     MR-ELBOW-W/O LEFT; Future  -     MR-HUMERUS W/O LEFT; Future  -     Referral to Physical Therapy         Based on history of presenting illness, review of systems and physical exam findings, most likely etiology of recurrent left elbow pain is sequela of left elbow contusion from work-related injury.  Work restrictions as ordered, pending MRI.  Discussed symptomatic management with pharmacotherapy and over-the-counter medications.   Discussed the risks the benefits and the indications of all new medications prescribed today.  Strict return and ED precautions were discussed and all questions were answered.      Differential diagnosis, natural history, supportive care, and indications for immediate follow-up discussed.    Advised the patient to follow-up with the primary care physician for recheck, reevaluation, and consideration of further management.    Please note that this dictation was created using voice recognition software. I have made reasonable attempt to correct obvious errors, but I expect that there are errors of grammar and possibly content that I did not discover before finalizing the note.    This note was electronically signed by Breana Lentz MD PhD         [1] No Known Allergies

## 2025-07-02 NOTE — LETTER
PHYSICIAN’S AND CHIROPRACTIC PHYSICIAN'S   PROGRESS REPORT   CERTIFICATION OF DISABILITY Claim Number:     Social Security Number:    Patient’s Name: Xochitl Martinez Date of Injury: 4/13/2025   Employer: [employer] [facility] Name of MCO (if applicable):      Patient’s Job Description/Occupation: SMO 2       Previous Injuries/Diseases/Surgeries Contributing to the Condition:         Diagnosis: (Y99.0) Work related injury  (primary encounter diagnosis)  (M25.522) Left elbow pain  (S50.02XA) Contusion of left elbow, initial encounter      Related to the Industrial Injury? Yes     Explain:        Objective Medical Findings:           None - Discharged                         Stable  No                 Ratable  No        Generally Improved                         Condition Worsened               X   Condition Same  May Have Suffered a Permanent Disability No     Treatment Plan:              No Change in Therapy                  PT/OT Prescribed                      Medication May be Used While Working        Case Management                          PT/OT Discontinued    Consultation    Further Diagnostic Studies:    Prescription(s)                 Released to FULL DUTY /No Restrictions on (Date):       Certified TOTALLY TEMPORARILY DISABLED (Indicate Dates) From:   To:    X  Released to RESTRICTED/Modified Duty on (Date): From: 7/2/2025 To: 7/9/2025  Restrictions Are:         No Sitting    No Standing X   No Pulling Other:         No Bending at Waist     No Stooping X    No Lifting    X    No Carrying     No Walking Lifting Restricted to (lbs.):       X   No Pushing     X   No Climbing     No Reaching Above Shoulders       Date of Next Visit:  7/9/2025 Date of this Exam: 7/2/2025 Physician/Chiropractic Physician Name: Breana Lentz MD, PhD Physician/Chiropractic Physician Signature:  Speedy Marquez DO MPH     [place]:  [address] [place] [address], [address] [place], [place] [address] -  Telephone [phone] [place]:  [facility]  [facility], [address] [place] [address] - Telephone [phone]    [email or web address]  D-39 (Rev. 10/24)

## 2025-07-02 NOTE — LETTER
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       Explain:        Objective Medical Findings:           None - Discharged                         Stable                    Ratable           Generally Improved                         Condition Worsened                  Condition Same  May Have Suffered a Permanent Disability       Treatment Plan:              No Change in Therapy                  PT/OT Prescribed                      Medication May be Used While Working        Case Management                          PT/OT Discontinued    Consultation    Further Diagnostic Studies:    Prescription(s)                 Released to FULL DUTY /No Restrictions on (Date):       Certified TOTALLY TEMPORARILY DISABLED (Indicate Dates) From:   To:      Released to RESTRICTED/Modified Duty on (Date): From:   To:    Restrictions Are:         No Sitting    No Standing    No Pulling Other:         No Bending at Waist     No Stooping     No Lifting        No Carrying     No Walking Lifting Restricted to (lbs.):          No Pushing        No Climbing     No Reaching Above Shoulders       Date of Next Visit:    Date of this Exam: 7/2/2025 Physician/Chiropractic Physician Name: Breana Lentz MD, PhD Physician/Chiropractic Physician Signature:  Speedy Marquez DO MPH     [place]:  [address] [place] [address], [address] [place], [place] [address] - Telephone [phone] [place]:   [address]  [facility], [address] [place], [place] [address] - Telephone [phone]    [email or web address]  D-39 (Rev. 10/24)

## 2025-07-09 ENCOUNTER — TELEPHONE (OUTPATIENT)
Dept: OCCUPATIONAL MEDICINE | Facility: CLINIC | Age: 60
End: 2025-07-09
Payer: COMMERCIAL

## 2025-07-10 ENCOUNTER — OCCUPATIONAL MEDICINE (OUTPATIENT)
Dept: OCCUPATIONAL MEDICINE | Facility: CLINIC | Age: 60
End: 2025-07-10
Payer: COMMERCIAL

## 2025-07-10 VITALS
HEART RATE: 96 BPM | DIASTOLIC BLOOD PRESSURE: 80 MMHG | SYSTOLIC BLOOD PRESSURE: 128 MMHG | HEIGHT: 69 IN | OXYGEN SATURATION: 98 % | BODY MASS INDEX: 35.4 KG/M2 | RESPIRATION RATE: 16 BRPM | WEIGHT: 239 LBS | TEMPERATURE: 97.5 F

## 2025-07-10 DIAGNOSIS — M79.632 LEFT FOREARM PAIN: ICD-10-CM

## 2025-07-10 DIAGNOSIS — S50.02XD CONTUSION OF LEFT ELBOW, SUBSEQUENT ENCOUNTER: Primary | ICD-10-CM

## 2025-07-10 DIAGNOSIS — S00.83XA CONTUSION OF LEFT JAW REGION: ICD-10-CM

## 2025-07-10 PROCEDURE — 99213 OFFICE O/P EST LOW 20 MIN: CPT | Performed by: PREVENTIVE MEDICINE

## 2025-07-10 NOTE — LETTER
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 Yes     Explain:        Objective Medical Findings: Left Elbow: Mild to moderate tenderness over the bilateral epicondyles as well as the distal bicep tendon and muscle body.  Painful range of motion.    MRI Left Elbow: Tendinosis of the bicep tendon insertion, extensor mechanism origin.  Ulnar neuritis in the cubital tunnel.  No bone contusion or occult fracture.         None - Discharged                         Stable  No                 Ratable  No        Generally Improved                      X   Condition Worsened                  Condition Same  May Have Suffered a Permanent Disability No     Treatment Plan:    Placed referral for orthopedics  Begin physical therapy  EMG/NCS left upper extremity, still pending  Recommend ibuprofen/Tylenol as needed  Continue elbow brace as needed  Continue full duty         No Change in Therapy                  PT/OT Prescribed                      Medication May be Used While Working        Case Management                          PT/OT Discontinued    Consultation    Further Diagnostic Studies:    Prescription(s)                 Released to FULL DUTY /No Restrictions on (Date):       Certified TOTALLY TEMPORARILY DISABLED (Indicate Dates) From:   To:    X  Released to RESTRICTED/Modified Duty on (Date): From: 7/10/2025 To: 8/14/2025  Restrictions Are:   Temporary      No Sitting    No Standing    No Pulling Other: No lift/push/pull with left arm       No Bending at Waist     No Stooping     No Lifting        No Carrying     No Walking Lifting Restricted to (lbs.):          No Pushing        No Climbing     No Reaching Above Shoulders       Date of Next Visit:  8/14/2025  @11 AM  Date of this Exam: 7/10/2025 Physician/Chiropractic Physician Name: Speedy Marquez D.O. Physician/Chiropractic Physician Signature:  Speedy Marquez DO [facility]:  [address] [place] [address], [address] [place], [place] [address] - Telephone [phone] [place]:  [address] [place] [address], [address] [place], [place] [address] - Telephone [phone]    [email or web address]  D-39 (Rev. 10/24)

## 2025-07-10 NOTE — PROGRESS NOTES
Subjective:   Xochitl Martinez is a 60 y.o. female who presents for Follow-Up ( [facility] DOI 4/13/25 FACIAL BONES L ELBOW/ pt states to feel same )      Date of Injury: 4/13/2025   Industrial Injury: Yes , Comments: I was walking in the cafeteria and I tripped on a drainage hole that wasn't properly cover  Previous Injuries/Diseases/Surgeries Contributing to the Condition:      4/17/2025: Patient states overall symptoms are improving significantly.  She states the pain at the left elbow is much better.  She states she saw some soreness and tenderness but feels like she can use her arm pretty well.  She states the bruise over her left jaw has gone down as well.  She has been using heat and ice for the elbow.  Has been working light duty.  Feels close to being able to return to her normal job.     4/24/2025: Patient states that overall left elbow pain has been improving.  She denies any radiating pain, numbness or tingling.  She states she has been using a elbow wrap which has been helping.  She does feel comfortable trying full duty next week if able.  She has noted onset of headaches mostly on the side where she was struck.  These are relieved with ibuprofen.  Gets these headaches about every other day.     5/8/2025: Patient states overall symptoms have improved a little bit.  She states she has been doing full duty with minimal difficulty.  She still does get occasional swelling and pain at the elbow especially the medial elbow.  She states that she does get occasional tingling in the hand but overall has had improvement.     6/5/2025: Patient states that overall symptoms are unchanged. Pain continues at medial elbow mostly. Worsened by frequent use of the left arm. Noted numbness and tingling throughout the entire hand. Pain is variable and ranges from 2 - 8/10. Toleration full duty with occasional flare ups of pain.     7/10/2025: Patient states that she had worsening symptoms over the last month.  She was seen  "in urgent care and put on restrictions.  She states the pain started going up into the bicep.  She had difficulty moving her arm at all for a few days.  She states that she has yet to start physical therapy.  She was able to get the MRI performed.  Has not had the nerve test done either.    PMH:   Reviewed, see above  MEDS:  Medications were reviewed in EMR  ALLERGIES:  Allergies were reviewed in EMR  SOCHX:  Works as a SMO 2 at [employer]   FH:   No pertinent family history to this problem     Objective:   /80   Pulse 96   Temp 36.4 °C (97.5 °F) (Temporal)   Resp 16   Ht 1.753 m (5' 9\")   Wt 108 kg (239 lb)   SpO2 98%   BMI 35.29 kg/m²     Constitutional: Patient is in no acute distress. Appears well-developed and well-nourished.   Cardiovascular: Normal rate.    Pulmonary/Chest: Effort normal. No respiratory distress.   Neurological: Patient is alert and oriented to person, place, and time.   Skin: Skin is warm and dry.   Psychiatric: Normal mood and affect. Behavior is normal.     Left Elbow: Mild to moderate tenderness over the bilateral epicondyles as well as the distal bicep tendon and muscle body.  Painful range of motion.    MRI Left Elbow: Tendinosis of the bicep tendon insertion, extensor mechanism origin.  Ulnar neuritis in the cubital tunnel.  No bone contusion or occult fracture.    Assessment/Plan:     1. Contusion of left elbow, subsequent encounter  - Referral to Orthopedics    2. Left forearm pain  - Referral to Orthopedics    3. Contusion of left jaw region      Restricted Duty FROM 7/10/2025 TO 8/14/2025, follow up scheduled on 8/14/2025 Restriction comments: No lift/push/pull with left arm  Placed referral for orthopedics  Begin physical therapy  EMG/NCS left upper extremity, still pending  Recommend ibuprofen/Tylenol as needed  Continue elbow brace as needed  Continue full duty    Differential diagnosis, natural history, supportive care, and indications " for immediate follow-up discussed.      Speedy Marquez D.O.

## 2025-07-26 ENCOUNTER — OCCUPATIONAL MEDICINE (OUTPATIENT)
Dept: URGENT CARE | Facility: CLINIC | Age: 60
End: 2025-07-26
Payer: COMMERCIAL

## 2025-07-26 VITALS
HEART RATE: 93 BPM | RESPIRATION RATE: 18 BRPM | DIASTOLIC BLOOD PRESSURE: 100 MMHG | HEIGHT: 69 IN | BODY MASS INDEX: 35.4 KG/M2 | TEMPERATURE: 97.6 F | OXYGEN SATURATION: 97 % | WEIGHT: 239 LBS | SYSTOLIC BLOOD PRESSURE: 140 MMHG

## 2025-07-26 DIAGNOSIS — Z02.6 ENCOUNTER RELATED TO WORKER'S COMPENSATION CLAIM: ICD-10-CM

## 2025-07-26 DIAGNOSIS — M67.88 BICEPS TENDINOSIS OF LEFT UPPER EXTREMITY: Primary | ICD-10-CM

## 2025-07-26 PROCEDURE — 99213 OFFICE O/P EST LOW 20 MIN: CPT | Performed by: PHYSICIAN ASSISTANT

## 2025-07-26 PROCEDURE — 3080F DIAST BP >= 90 MM HG: CPT | Performed by: PHYSICIAN ASSISTANT

## 2025-07-26 PROCEDURE — 3077F SYST BP >= 140 MM HG: CPT | Performed by: PHYSICIAN ASSISTANT

## 2025-07-26 RX ORDER — METHYLPREDNISOLONE 4 MG/1
TABLET ORAL
Qty: 21 TABLET | Refills: 0 | Status: SHIPPED | OUTPATIENT
Start: 2025-07-26

## 2025-07-26 NOTE — PROGRESS NOTES
Subjective:   Xochitl Martinez is a 60 y.o. female who presents for Injury ( FU)      Date of Injury: 4/13/2025   Industrial Injury: Yes , Comments: Date of Injury: 4/13/2025   Industrial Injury: Yes , Comments: I was walking in the cafeteria and I tripped on a drainage hole that wasn't properly cover  Previous Injuries/Diseases/Surgeries Contributing to the Condition:       4/17/2025: Patient states overall symptoms are improving significantly.  She states the pain at the left elbow is much better.  She states she saw some soreness and tenderness but feels like she can use her arm pretty well.  She states the bruise over her left jaw has gone down as well.  She has been using heat and ice for the elbow.  Has been working light duty.  Feels close to being able to return to her normal job.     4/24/2025: Patient states that overall left elbow pain has been improving.  She denies any radiating pain, numbness or tingling.  She states she has been using a elbow wrap which has been helping.  She does feel comfortable trying full duty next week if able.  She has noted onset of headaches mostly on the side where she was struck.  These are relieved with ibuprofen.  Gets these headaches about every other day.     5/8/2025: Patient states overall symptoms have improved a little bit.  She states she has been doing full duty with minimal difficulty.  She still does get occasional swelling and pain at the elbow especially the medial elbow.  She states that she does get occasional tingling in the hand but overall has had improvement.     6/5/2025: Patient states that overall symptoms are unchanged. Pain continues at medial elbow mostly. Worsened by frequent use of the left arm. Noted numbness and tingling throughout the entire hand. Pain is variable and ranges from 2 - 8/10. Toleration full duty with occasional flare ups of pain.      7/10/2025: Patient states that she had worsening symptoms over the last month.  She was  "seen in urgent care and put on restrictions.  She states the pain started going up into the bicep.  She had difficulty moving her arm at all for a few days.  She states that she has yet to start physical therapy.  She was able to get the MRI performed.  Has not had the nerve test done either.    7/26/25:  Pain is becoming more severe.  Not controlled by tylenol/ibuprofen.  Pain is aggravated with movement.  Still awaiting appt with ortho.  Has upcoming f/u with occ med 8/14.         Previous Injuries/Diseases/Surgeries Contributing to the Condition:      PMH:   Reviewed, see above  MEDS:  Medications were reviewed in EMR  ALLERGIES:  Allergies were reviewed in EMR  SOCHX:  Works as a SMO 2   FH:   No pertinent family history to this problem     Objective:   BP (!) 140/100   Pulse 93   Temp 36.4 °C (97.6 °F)   Resp 18   Ht 1.753 m (5' 9\")   Wt 108 kg (239 lb)   SpO2 97%   BMI 35.29 kg/m²     Left Elbow: Mild to moderate tenderness over the bilateral epicondyles as well as the distal bicep tendon and muscle body.  Painful range of motion.    Assessment/Plan:     1. Biceps tendinosis of left upper extremity  - methylPREDNISolone (MEDROL DOSEPAK) 4 MG Tablet Therapy Pack; Follow schedule on package instructions.  Dispense: 21 Tablet; Refill: 0    2. Encounter related to worker's compensation claim      Restricted Duty FROM   TO  , follow up scheduled on 8/14/2025 Restriction comments: No use of left arm, no lifting with LUE  Treatment plan comments: Medrol dosepak  Hold nsaid while taking  No use of LUE  Awaiting Ortho appt  Has f/u with occ med     Differential diagnosis, natural history, supportive care, and indications for immediate follow-up discussed.    Ginna Villeda P.A.-C.   "

## 2025-07-26 NOTE — LETTER
PHYSICIAN’S AND CHIROPRACTIC PHYSICIAN'S   PROGRESS REPORT   CERTIFICATION OF DISABILITY Claim Number:     Social Security Number:    Patient’s Name: Xochitl Martinez Date of Injury: 4/13/2025   Employer:   [employer] [facility] Name of MCO (if applicable):      Patient’s Job Description/Occupation: SMO 2       Previous Injuries/Diseases/Surgeries Contributing to the Condition:         Diagnosis: (M67.88) Biceps tendinosis of left upper extremity  (primary encounter diagnosis)  (Z02.6) Encounter related to worker's compensation claim      Related to the Industrial Injury? Yes     Explain: Date of Injury: 4/13/2025   Industrial Injury: Yes , Comments: I was walking in the cafeteria and I tripped on a drainage hole that wasn't properly cover  Previous Injuries/Diseases/Surgeries Contributing to the Condition:       4/17/2025: Patient states overall symptoms are improving significantly.  She states the pain at the left elbow is much better.  She states she saw some soreness and tenderness but feels like she can use her arm pretty well.  She states the bruise over her left jaw has gone down as well.  She has been using heat and ice for the elbow.  Has been working light duty.  Feels close to being able to return to her normal job.     4/24/2025: Patient states that overall left elbow pain has been improving.  She denies any radiating pain, numbness or tingling.  She states she has been using a elbow wrap which has been helping.  She does feel comfortable trying full duty next week if able.  She has noted onset of headaches mostly on the side where she was struck.  These are relieved with ibuprofen.  Gets these headaches about every other day.     5/8/2025: Patient states overall symptoms have improved a little bit.  She states she has been doing full duty with minimal difficulty.  She still does get occasional swelling and pain at the elbow especially the medial elbow.  She states that  she does get occasional tingling in the hand but overall has had improvement.     6/5/2025: Patient states that overall symptoms are unchanged. Pain continues at medial elbow mostly. Worsened by frequent use of the left arm. Noted numbness and tingling throughout the entire hand. Pain is variable and ranges from 2 - 8/10. Toleration full duty with occasional flare ups of pain.      7/10/2025: Patient states that she had worsening symptoms over the last month.  She was seen in urgent care and put on restrictions.  She states the pain started going up into the bicep.  She had difficulty moving her arm at all for a few days.  She states that she has yet to start physical therapy.  She was able to get the MRI performed.  Has not had the nerve test done either.    7/26/25:  Pain is becoming more severe.  Not controlled by tylenol/ibuprofen.  Pain is aggravated with movement.  Still awaiting appt with ortho.  Has upcoming f/u with occ med 8/14.            Objective Medical Findings: Left Elbow: Mild to moderate tenderness over the bilateral epicondyles as well as the distal bicep tendon and muscle body.  Painful range of motion.         None - Discharged                         Stable  No                 Ratable  No        Generally Improved                      X   Condition Worsened                  Condition Same  May Have Suffered a Permanent Disability No     Treatment Plan:    Medrol dosepak  Hold nsaid while taking  No use of LUE  Awaiting Ortho appt  Has f/u with occ med         No Change in Therapy                  PT/OT Prescribed                      Medication May be Used While Working        Case Management                          PT/OT Discontinued    Consultation    Further Diagnostic Studies:    Prescription(s)           Medrol dosepak     Released to FULL DUTY /No Restrictions on (Date):       Certified TOTALLY TEMPORARILY DISABLED (Indicate Dates) From:   To:    X  Released to RESTRICTED/Modified Duty  on (Date): From:   To:    Restrictions Are:         No Sitting    No Standing    No Pulling Other: No use of left arm, no lifting with LUE       No Bending at Waist     No Stooping     No Lifting        No Carrying     No Walking Lifting Restricted to (lbs.):          No Pushing     X   No Climbing     No Reaching Above Shoulders       Date of Next Visit:  8/14/2025 Date of this Exam: 7/26/2025 Physician/Chiropractic Physician Name: Ginna Villeda P.A.-C. Physician/Chiropractic Physician Signature:  Speedy Marquez DO MPH     [place]:  [address] [place] [address], [address] [place], [place] [address] - Telephone [phone] [place]:  [address] [place] [address], [address] [place], [place] [address] - Telephone [phone]    [email or web address]  D-39 (Rev. 10/24)

## 2025-07-31 NOTE — Clinical Note
REFERRAL APPROVAL NOTICE         Sent on July 31, 2025                   Xochitl Martinez  [address]  [facility] [address]                   Dear Ms. Juan Pablo Martinez,    After a careful review of the medical information and benefit coverage, [facility] has processed your referral. See below for additional details.    If applicable, you must be actively enrolled with your insurance for coverage of the authorized service. If you have any questions regarding your coverage, please contact your insurance directly.    REFERRAL INFORMATION   Referral #:  [id number]  Referred-To Department    Referred-By Provider:  Orthopedics    Speedy Marquez D.O.   [facility]      [address] [place] [address]  [phone] [address] [place] [id number]  [phone]    Referral Start Date:  07/10/2025  Referral End Date:   07/10/2026             SCHEDULING  If you do not already have an appointment, please call [phone] to make an appointment.     MORE INFORMATION  If you do not already have a [facility] account, sign up at: [email or web address]  You can access your medical information, make appointments, see lab results, billing information, and more.  If you have questions regarding this referral, please contact  the [facility] Referrals department at:             [phone]. Monday - Friday 8:00AM - 5:00PM.     Sincerely,    [facility]

## 2025-08-13 ENCOUNTER — TELEPHONE (OUTPATIENT)
Dept: OCCUPATIONAL MEDICINE | Facility: CLINIC | Age: 60
End: 2025-08-13
Payer: COMMERCIAL

## 2025-08-14 ENCOUNTER — OCCUPATIONAL MEDICINE (OUTPATIENT)
Dept: OCCUPATIONAL MEDICINE | Facility: CLINIC | Age: 60
End: 2025-08-14
Payer: COMMERCIAL

## 2025-08-14 VITALS
BODY MASS INDEX: 35.68 KG/M2 | DIASTOLIC BLOOD PRESSURE: 70 MMHG | HEART RATE: 113 BPM | TEMPERATURE: 97.1 F | HEIGHT: 70 IN | RESPIRATION RATE: 18 BRPM | SYSTOLIC BLOOD PRESSURE: 112 MMHG | OXYGEN SATURATION: 98 % | WEIGHT: 249.2 LBS

## 2025-08-14 DIAGNOSIS — S50.02XD CONTUSION OF LEFT ELBOW, SUBSEQUENT ENCOUNTER: Primary | ICD-10-CM

## 2025-08-14 DIAGNOSIS — M79.632 LEFT FOREARM PAIN: ICD-10-CM

## 2025-08-14 PROCEDURE — 3078F DIAST BP <80 MM HG: CPT | Performed by: PREVENTIVE MEDICINE

## 2025-08-14 PROCEDURE — 3074F SYST BP LT 130 MM HG: CPT | Performed by: PREVENTIVE MEDICINE

## 2025-08-14 PROCEDURE — 99213 OFFICE O/P EST LOW 20 MIN: CPT | Performed by: PREVENTIVE MEDICINE

## 2025-09-03 ENCOUNTER — APPOINTMENT (OUTPATIENT)
Dept: OCCUPATIONAL MEDICINE | Facility: CLINIC | Age: 60
End: 2025-09-03
Payer: COMMERCIAL